# Patient Record
Sex: MALE | URBAN - METROPOLITAN AREA
[De-identification: names, ages, dates, MRNs, and addresses within clinical notes are randomized per-mention and may not be internally consistent; named-entity substitution may affect disease eponyms.]

---

## 2023-03-02 ENCOUNTER — APPOINTMENT (OUTPATIENT)
Dept: URBAN - METROPOLITAN AREA CLINIC 315 | Age: 58
Setting detail: DERMATOLOGY
End: 2023-03-02

## 2023-03-02 DIAGNOSIS — Z85.828 PERSONAL HISTORY OF OTHER MALIGNANT NEOPLASM OF SKIN: ICD-10-CM

## 2023-03-02 DIAGNOSIS — D22 MELANOCYTIC NEVI: ICD-10-CM

## 2023-03-02 DIAGNOSIS — L57.8 OTHER SKIN CHANGES DUE TO CHRONIC EXPOSURE TO NONIONIZING RADIATION: ICD-10-CM

## 2023-03-02 DIAGNOSIS — L81.4 OTHER MELANIN HYPERPIGMENTATION: ICD-10-CM

## 2023-03-02 DIAGNOSIS — Z12.83 ENCOUNTER FOR SCREENING FOR MALIGNANT NEOPLASM OF SKIN: ICD-10-CM

## 2023-03-02 PROBLEM — D23.71 OTHER BENIGN NEOPLASM OF SKIN OF RIGHT LOWER LIMB, INCLUDING HIP: Status: ACTIVE | Noted: 2023-03-02

## 2023-03-02 PROBLEM — D22.5 MELANOCYTIC NEVI OF TRUNK: Status: ACTIVE | Noted: 2023-03-02

## 2023-03-02 PROCEDURE — OTHER SUNSCREEN RECOMMENDATIONS: OTHER

## 2023-03-02 PROCEDURE — OTHER COUNSELING: OTHER

## 2023-03-02 PROCEDURE — 99213 OFFICE O/P EST LOW 20 MIN: CPT

## 2023-03-02 PROCEDURE — OTHER MIPS QUALITY: OTHER

## 2023-03-02 PROCEDURE — OTHER RECORDS REVIEWED: OTHER

## 2023-03-02 PROCEDURE — OTHER REASSURANCE: OTHER

## 2023-03-02 ASSESSMENT — LOCATION SIMPLE DESCRIPTION DERM
LOCATION SIMPLE: RIGHT UPPER BACK
LOCATION SIMPLE: RIGHT ANTERIOR NECK
LOCATION SIMPLE: LEFT UPPER BACK
LOCATION SIMPLE: UPPER BACK
LOCATION SIMPLE: NECK
LOCATION SIMPLE: LEFT ANTERIOR NECK
LOCATION SIMPLE: POSTERIOR NECK

## 2023-03-02 ASSESSMENT — LOCATION DETAILED DESCRIPTION DERM
LOCATION DETAILED: RIGHT SUPERIOR UPPER BACK
LOCATION DETAILED: LEFT CLAVICULAR NECK
LOCATION DETAILED: RIGHT MEDIAL TRAPEZIAL NECK
LOCATION DETAILED: LEFT SUPERIOR LATERAL NECK
LOCATION DETAILED: INFERIOR THORACIC SPINE
LOCATION DETAILED: LEFT MEDIAL TRAPEZIAL NECK
LOCATION DETAILED: LEFT SUPERIOR UPPER BACK
LOCATION DETAILED: RIGHT CLAVICULAR NECK

## 2023-03-02 ASSESSMENT — LOCATION ZONE DERM
LOCATION ZONE: NECK
LOCATION ZONE: TRUNK

## 2023-08-22 ENCOUNTER — TELEPHONE (OUTPATIENT)
Dept: RHEUMATOLOGY | Facility: CLINIC | Age: 58
End: 2023-08-22

## 2023-08-22 ENCOUNTER — HOSPITAL ENCOUNTER (OUTPATIENT)
Dept: GENERAL RADIOLOGY | Age: 58
Discharge: HOME | End: 2023-08-22
Attending: INTERNAL MEDICINE
Payer: COMMERCIAL

## 2023-08-22 ENCOUNTER — OFFICE VISIT (OUTPATIENT)
Dept: RHEUMATOLOGY | Facility: CLINIC | Age: 58
End: 2023-08-22
Payer: COMMERCIAL

## 2023-08-22 VITALS
WEIGHT: 205 LBS | RESPIRATION RATE: 16 BRPM | OXYGEN SATURATION: 97 % | TEMPERATURE: 98 F | HEIGHT: 69 IN | BODY MASS INDEX: 30.36 KG/M2 | SYSTOLIC BLOOD PRESSURE: 130 MMHG | HEART RATE: 81 BPM | DIASTOLIC BLOOD PRESSURE: 80 MMHG

## 2023-08-22 DIAGNOSIS — M25.562 CHRONIC PAIN OF LEFT KNEE: ICD-10-CM

## 2023-08-22 DIAGNOSIS — M19.90 OSTEOARTHRITIS, UNSPECIFIED OSTEOARTHRITIS TYPE, UNSPECIFIED SITE: ICD-10-CM

## 2023-08-22 DIAGNOSIS — M06.09 RHEUMATOID ARTHRITIS OF MULTIPLE SITES WITHOUT RHEUMATOID FACTOR (HCC): ICD-10-CM

## 2023-08-22 DIAGNOSIS — M77.9 TENDONITIS: ICD-10-CM

## 2023-08-22 DIAGNOSIS — M06.09 RHEUMATOID ARTHRITIS OF MULTIPLE SITES WITHOUT RHEUMATOID FACTOR (HCC): Primary | ICD-10-CM

## 2023-08-22 DIAGNOSIS — G89.29 CHRONIC PAIN OF LEFT KNEE: ICD-10-CM

## 2023-08-22 PROCEDURE — 3075F SYST BP GE 130 - 139MM HG: CPT | Performed by: INTERNAL MEDICINE

## 2023-08-22 PROCEDURE — 20610 DRAIN/INJ JOINT/BURSA W/O US: CPT | Performed by: INTERNAL MEDICINE

## 2023-08-22 PROCEDURE — 3079F DIAST BP 80-89 MM HG: CPT | Performed by: INTERNAL MEDICINE

## 2023-08-22 PROCEDURE — 99215 OFFICE O/P EST HI 40 MIN: CPT | Performed by: INTERNAL MEDICINE

## 2023-08-22 PROCEDURE — 73560 X-RAY EXAM OF KNEE 1 OR 2: CPT | Performed by: INTERNAL MEDICINE

## 2023-08-22 PROCEDURE — 3008F BODY MASS INDEX DOCD: CPT | Performed by: INTERNAL MEDICINE

## 2023-08-22 PROCEDURE — 73130 X-RAY EXAM OF HAND: CPT | Performed by: INTERNAL MEDICINE

## 2023-08-22 RX ORDER — CLOBETASOL PROPIONATE 0.5 MG/G
CREAM TOPICAL 2 TIMES DAILY
COMMUNITY

## 2023-08-22 RX ORDER — COLCHICINE 0.6 MG/1
1 TABLET ORAL DAILY
COMMUNITY
Start: 2023-04-19 | End: 2023-08-22

## 2023-08-22 RX ORDER — LEVOTHYROXINE SODIUM 75 MCG
75 TABLET ORAL
COMMUNITY
Start: 2023-07-31

## 2023-08-22 RX ORDER — TERBINAFINE HYDROCHLORIDE 250 MG/1
250 TABLET ORAL DAILY
COMMUNITY
Start: 2023-08-16

## 2023-08-22 RX ORDER — TRIAMCINOLONE ACETONIDE 40 MG/ML
40 INJECTION, SUSPENSION INTRA-ARTICULAR; INTRAMUSCULAR ONCE
Status: COMPLETED | OUTPATIENT
Start: 2023-08-22 | End: 2023-08-22

## 2023-08-22 RX ORDER — METHYLPREDNISOLONE 4 MG/1
TABLET ORAL
Qty: 1 EACH | Refills: 0 | Status: SHIPPED | OUTPATIENT
Start: 2023-08-22

## 2023-08-22 RX ORDER — COLCHICINE 0.6 MG/1
0.6 TABLET ORAL 2 TIMES DAILY PRN
Qty: 180 TABLET | Refills: 0 | Status: SHIPPED | OUTPATIENT
Start: 2023-08-22

## 2023-08-22 RX ORDER — MEDROXYPROGESTERONE ACETATE 150 MG/ML
1 INJECTION, SUSPENSION INTRAMUSCULAR CONTINUOUS PRN
COMMUNITY
Start: 2023-05-30 | End: 2023-08-22

## 2023-08-22 RX ADMIN — TRIAMCINOLONE ACETONIDE 40 MG: 40 INJECTION, SUSPENSION INTRA-ARTICULAR; INTRAMUSCULAR at 13:04:00

## 2023-08-22 NOTE — TELEPHONE ENCOUNTER
Medication sent to [facility]. If PA required, our office will receive notification that one is needed.

## 2023-08-22 NOTE — PATIENT INSTRUCTIONS
OSTEOARTHRITIS    Fast Facts    Though some of the joint changes are irreversible, most patients will not need joint replacement surgery. OA symptoms (what you feel) can vary greatly among patients. A rheumatologist can detect arthritis and prescribe the proper treatment. The goal of treatment in OA is to reduce pain and improve function. Exercise is an important part of OA treatment, because it can decrease joint pain and improve function. At present, there is no treatment that can reverse the damage of OA in the joints. Researchers are trying to find ways to slow or reverse this joint damage. Osteoarthritis (also known as OA) is a common joint disease that most often affects middle-age to elderly people. It is commonly referred to as \"wear and tear\" of the joints, but we now know that OA is a disease of the entire joint, involving the cartilage, joint lining, ligaments, and bone. Although it is more common in older people, it is not really accurate to say that the joints are just \"wearing out. \" It is characterized by breakdown of the cartilage (the tissue that cushions the ends of the bones between joints), bony changes of the joints, deterioration of tendons and ligaments, and various degrees of inflammation of the joint lining (called the synovium). This arthritis tends to occur in the hand joints, spine, hips, knees, and great toes. The lifetime risk of developing OA of the knee is about 46%, and the lifetime risk of developing OA of the hip is 25%, according to the [facility], a long-term study from the [address] [place] [address] and sponsored by [employer] for [employer] and [employer] (often called the [employer]) and the [employer]. OA is a top cause of disability in older people. The goal of osteoarthritis treatment is to reduce pain and improve function.  There is no cure for the disease, but some treatments attempt to slow disease progression. What is osteoarthritis? OA is a frequently slowly progressive joint disease typically seen in middle-aged to elderly people. In osteoarthritis, the cartilage between the bones in the joint breaks down. This causes the affected bones to slowly get bigger. The joint cartilage often breaks down because of mechanical stress or biochemical changes within the body, causing the bone underneath to fail. OA can occur together with other types of arthritis, such as gout or rheumatoid arthritis. OA tends to affect commonly used joints such as the hands and spine, and the weight-bearing joints such as the hips and knees. Symptoms include:    Joint pain and stiffness    Knobby swelling at the joint    Cracking or grinding noise with joint movement    Decreased function of the joint    How do you treat osteoarthritis? There is no proven treatment yet that can reverse joint damage from OA. The goal of osteoarthritis treatment is to reduce pain and improve function of the affected joints. Most often, this is possible with a mixture of physical measures and drug therapy and, sometimes, surgery. Physical measures: Weight loss and exercise are useful in OA. Excess weight puts stress on your knee joints and hips and low back. For every 10 pounds of weight you lose over 10 years, you can reduce the chance of developing knee OA by up to 50 percent. Exercise can improve your muscle strength, decrease joint pain and stiffness, and lower the chance of disability due to OA. Also helpful are support (\"assistive\") devices, such as orthotics or a walking cane, that help you do daily activities. Heat or cold therapy can help relieve OA symptoms for a short time. Certain alternative treatments such as spa (hot tub), massage, and chiropractic manipulation can help relieve pain for a short time. They can be costly, though, and require repeated treatments.  Also, the long-term benefits of these alternative (sometimes called complementary or integrative) medicine treatments are unproven but are under study. Drug therapy: Forms of drug therapy include topical, oral (by mouth) and injections (shots). You apply topical drugs directly on the skin over the affected joints. These medicines include capsaicin cream, lidocaine and diclofenac gel. Oral pain relievers such as acetaminophen are common first treatments. So are nonsteroidal anti-inflammatory drugs (often called NSAIDs), which decrease swelling and pain. In 2010, the government (FDA) approved the use of duloxetine (Cymbalta) for chronic (long-term) musculoskeletal pain including from OA. This oral drug is not new. It also is in use for other health concerns, such as mood disorders, nerve pain and fibromyalgia. Patients with more serious pain may need stronger medications, such as prescription narcotics. Joint injections with corticosteroids (sometimes called cortisone shots) or with a form of lubricant called hyaluronic acid can give months of pain relief from OA. This lubricant is given in the knee, and these shots may help delay the need for a knee replacement by a few years in some patients. Surgery: Surgical treatment becomes an option for severe cases. This includes when the joint has serious damage, or when medical treatment fails to relieve pain and you have major loss of function. Surgery may involve arthroscopy, repair of the joint done through small incisions (cuts). If the joint damage cannot be repaired, you may need a joint replacement. Supplements: Many over-the-counter nutrition supplements have been used for osteoarthritis treatment. Most lack good research data to support their effectiveness and safety. Among the most widely used are calcium, vitamin D and omega-3 fatty acids. To ensure safety and avoid drug interactions, consult your doctor or pharmacist before using any of these supplements.  This is especially true when you are combining these supplements with prescribed

## 2023-08-22 NOTE — TELEPHONE ENCOUNTER
Patient has been stable on Enbrel 50 mg subcu once a week for many years.   I sent it to [address] [place] [address] and I am not sure if you need to do anything more? new preauthorization etc.

## 2023-08-29 ENCOUNTER — TELEPHONE (OUTPATIENT)
Dept: RHEUMATOLOGY | Facility: CLINIC | Age: 58
End: 2023-08-29

## 2023-08-29 NOTE — TELEPHONE ENCOUNTER
NDICATIONS:  G89.29 Chronic pain of left knee M25.562 Chronic pain of left knee M19.90 Osteoarthritis, unspecified osteoarthritis type, unspecified site M06.09 Rheumatoid a*      PATIENT STATED HISTORY: (As transcribed by Technologist)  Left knee pain and stiffness all over joint, off and on, for past 20yrs. FINDINGS:  No evidence of acute displaced fracture or dislocation. Normal mineralization. Mild-to-moderate tricompartmental osteoarthritic changes noted. Small suprapatellar joint effusion. Moderate arthritis of the left knee    Hand x-ray showed mild osteoarthritis    No change from us unless patient worsens clinically.

## 2023-08-29 NOTE — TELEPHONE ENCOUNTER
Pt called back to go over results. I advised him I would route the call and someone would call him back. After hanging up with him I realized I could have given him his results. I called back and left a VM for him to call back.

## 2023-09-05 NOTE — TELEPHONE ENCOUNTER
Called pt, notified of imaging results per Dr. Tiesha Perez. Moderate arthritis of the left knee     Hand x-ray showed mild osteoarthritis     No change from us unless patient worsens clinically.     Pt voices understanding

## 2023-12-06 RX ORDER — MEDROXYPROGESTERONE ACETATE 150 MG/ML
50 INJECTION, SUSPENSION INTRAMUSCULAR
Qty: 12 ML | Refills: 5 | Status: SHIPPED | OUTPATIENT
Start: 2023-12-06

## 2023-12-06 NOTE — TELEPHONE ENCOUNTER
LOV:   08/22/2023       Future Appointments   Date Time Provider Layo Starks   2/19/2024 10:40 AM Gianluca Ruiz MD [facility] [address]       LF:  08/22/2023    QTY:   4.2 mL      Refills:    5    LABS:      Lab results scanned under the lab tab  as lab result scan on 07/20/2023

## 2023-12-18 ENCOUNTER — TELEPHONE (OUTPATIENT)
Dept: RHEUMATOLOGY | Facility: CLINIC | Age: 58
End: 2023-12-18

## 2023-12-18 NOTE — TELEPHONE ENCOUNTER
PA for Enbrel [employer] started on [employer].  Key: [id number]    Not submitted -- outcome from health plan- Clinical Override is not needed

## 2024-01-15 RX ORDER — COLCHICINE 0.6 MG/1
0.6 TABLET ORAL 2 TIMES DAILY PRN
Qty: 180 TABLET | Refills: 3 | Status: SHIPPED | OUTPATIENT
Start: 2024-01-15

## 2024-01-15 NOTE — TELEPHONE ENCOUNTER
LOV:   08/22/2023      Future Appointments   Date Time Provider Department Center   2/19/2024 10:40 AM Donna Ruiz MD [facility] [address]       LF:  08/22/2023   QTY:  180   Refills:  0

## 2024-02-19 ENCOUNTER — LAB ENCOUNTER (OUTPATIENT)
Dept: LAB | Age: 59
End: 2024-02-19
Attending: INTERNAL MEDICINE
Payer: COMMERCIAL

## 2024-02-19 ENCOUNTER — OFFICE VISIT (OUTPATIENT)
Dept: RHEUMATOLOGY | Facility: CLINIC | Age: 59
End: 2024-02-19
Payer: COMMERCIAL

## 2024-02-19 VITALS
TEMPERATURE: 98 F | BODY MASS INDEX: 30.21 KG/M2 | HEIGHT: 69 IN | DIASTOLIC BLOOD PRESSURE: 80 MMHG | RESPIRATION RATE: 16 BRPM | OXYGEN SATURATION: 96 % | WEIGHT: 204 LBS | HEART RATE: 77 BPM | SYSTOLIC BLOOD PRESSURE: 120 MMHG

## 2024-02-19 DIAGNOSIS — M19.90 OSTEOARTHRITIS, UNSPECIFIED OSTEOARTHRITIS TYPE, UNSPECIFIED SITE: Primary | ICD-10-CM

## 2024-02-19 DIAGNOSIS — M06.09 RHEUMATOID ARTHRITIS OF MULTIPLE SITES WITHOUT RHEUMATOID FACTOR (HCC): ICD-10-CM

## 2024-02-19 DIAGNOSIS — G89.29 CHRONIC PAIN OF LEFT KNEE: ICD-10-CM

## 2024-02-19 DIAGNOSIS — M19.90 OSTEOARTHRITIS, UNSPECIFIED OSTEOARTHRITIS TYPE, UNSPECIFIED SITE: ICD-10-CM

## 2024-02-19 DIAGNOSIS — M25.562 CHRONIC PAIN OF LEFT KNEE: ICD-10-CM

## 2024-02-19 PROBLEM — G56.92 NEUROPATHY OF FINGER OF LEFT HAND: Status: RESOLVED | Noted: 2024-02-19 | Resolved: 2024-02-19

## 2024-02-19 PROBLEM — G56.92 NEUROPATHY OF FINGER OF LEFT HAND: Status: ACTIVE | Noted: 2024-02-19

## 2024-02-19 LAB
ALBUMIN SERPL-MCNC: 4.1 G/DL (ref 3.4–5)
ALBUMIN/GLOB SERPL: 1 {RATIO} (ref 1–2)
ALP LIVER SERPL-CCNC: 85 U/L
ALT SERPL-CCNC: 71 U/L
ANION GAP SERPL CALC-SCNC: 3 MMOL/L (ref 0–18)
AST SERPL-CCNC: 32 U/L (ref 15–37)
BASOPHILS # BLD AUTO: 0.06 X10(3) UL (ref 0–0.2)
BASOPHILS NFR BLD AUTO: 0.7 %
BILIRUB SERPL-MCNC: 0.4 MG/DL (ref 0.1–2)
BUN BLD-MCNC: 15 MG/DL (ref 9–23)
CALCIUM BLD-MCNC: 9.6 MG/DL (ref 8.5–10.1)
CHLORIDE SERPL-SCNC: 106 MMOL/L (ref 98–112)
CO2 SERPL-SCNC: 29 MMOL/L (ref 21–32)
CREAT BLD-MCNC: 1.21 MG/DL
EGFRCR SERPLBLD CKD-EPI 2021: 69 ML/MIN/1.73M2 (ref 60–?)
EOSINOPHIL # BLD AUTO: 0.23 X10(3) UL (ref 0–0.7)
EOSINOPHIL NFR BLD AUTO: 2.8 %
ERYTHROCYTE [DISTWIDTH] IN BLOOD BY AUTOMATED COUNT: 13.5 %
ERYTHROCYTE [SEDIMENTATION RATE] IN BLOOD: 53 MM/HR
FASTING STATUS PATIENT QL REPORTED: NO
GLOBULIN PLAS-MCNC: 4.3 G/DL (ref 2.8–4.4)
GLUCOSE BLD-MCNC: 95 MG/DL (ref 70–99)
HCT VFR BLD AUTO: 46.8 %
HGB BLD-MCNC: 15.6 G/DL
IMM GRANULOCYTES # BLD AUTO: 0.04 X10(3) UL (ref 0–1)
IMM GRANULOCYTES NFR BLD: 0.5 %
LYMPHOCYTES # BLD AUTO: 2.56 X10(3) UL (ref 1–4)
LYMPHOCYTES NFR BLD AUTO: 31.4 %
MCH RBC QN AUTO: 28.8 PG (ref 26–34)
MCHC RBC AUTO-ENTMCNC: 33.3 G/DL (ref 31–37)
MCV RBC AUTO: 86.3 FL
MONOCYTES # BLD AUTO: 0.82 X10(3) UL (ref 0.1–1)
MONOCYTES NFR BLD AUTO: 10 %
NEUTROPHILS # BLD AUTO: 4.45 X10 (3) UL (ref 1.5–7.7)
NEUTROPHILS # BLD AUTO: 4.45 X10(3) UL (ref 1.5–7.7)
NEUTROPHILS NFR BLD AUTO: 54.6 %
OSMOLALITY SERPL CALC.SUM OF ELEC: 287 MOSM/KG (ref 275–295)
PLATELET # BLD AUTO: 367 10(3)UL (ref 150–450)
POTASSIUM SERPL-SCNC: 4.2 MMOL/L (ref 3.5–5.1)
PROT SERPL-MCNC: 8.4 G/DL (ref 6.4–8.2)
RBC # BLD AUTO: 5.42 X10(6)UL
SODIUM SERPL-SCNC: 138 MMOL/L (ref 136–145)
VIT D+METAB SERPL-MCNC: 33.4 NG/ML (ref 30–100)
WBC # BLD AUTO: 8.2 X10(3) UL (ref 4–11)

## 2024-02-19 PROCEDURE — 36415 COLL VENOUS BLD VENIPUNCTURE: CPT

## 2024-02-19 PROCEDURE — 86480 TB TEST CELL IMMUN MEASURE: CPT

## 2024-02-19 PROCEDURE — 82306 VITAMIN D 25 HYDROXY: CPT

## 2024-02-19 PROCEDURE — 80053 COMPREHEN METABOLIC PANEL: CPT

## 2024-02-19 PROCEDURE — 85652 RBC SED RATE AUTOMATED: CPT

## 2024-02-19 PROCEDURE — 85025 COMPLETE CBC W/AUTO DIFF WBC: CPT

## 2024-02-19 RX ORDER — TRIAMCINOLONE ACETONIDE 40 MG/ML
40 INJECTION, SUSPENSION INTRA-ARTICULAR; INTRAMUSCULAR ONCE
Status: COMPLETED | OUTPATIENT
Start: 2024-02-19 | End: 2024-02-19

## 2024-02-19 RX ORDER — MEDROXYPROGESTERONE ACETATE 150 MG/ML
50 INJECTION, SUSPENSION INTRAMUSCULAR
Qty: 12 ML | Refills: 5 | Status: SHIPPED | OUTPATIENT
Start: 2024-02-19

## 2024-02-19 RX ADMIN — TRIAMCINOLONE ACETONIDE 40 MG: 40 INJECTION, SUSPENSION INTRA-ARTICULAR; INTRAMUSCULAR at 11:39:00

## 2024-02-19 NOTE — PROGRESS NOTES
[facility], [address] [place] [address]      Consult     Omar Roe Patient Status:  No patient class for patient encounter    1965 MRN [id number]   Location [address] Attending No att. providers found   Hosp Day # 0 PCP YOVANI LIANG     Referring Provider:     Reason for Consultation:     Subjective:    Omar Roe is a 58 year old male for follow-up for seropositive rheumatoid arthritis with incidental chondrocalcinosis of the knees and pseudogout arthritis. Also generalized osteoarthritis.    Patient was last seen in clinic 2023    Labs stable 2023    He has been doing well subsequently.    He had a trigger finger injection 2020 with continued improvement.    He also had a left knee injection 2023 and states he had lasted in 3 months previously failed Synvisc injections    He states they are not lasting as long as it used to and he is considering seeing another orthopedic surgeon consider left knee replacement surgery in the next year.  He is hoping to get through the summer to play golf    Overall hand pain and stiffness as well    X-rays updated of the hands were unrevealing other than mild arthritis in 2023 left knee x-rays showed moderate to severe arthritis of the left knee    He has backed down on his colchicine to once a day.  He is open to updating labs at this time    He remains active and exercises regularly.     But recently noticed increased stiffness in his left knee and is looking for another knee injection which she had done in May 2022    Previously he has had chronic lesion removed prior to starting Enbrel and was diagnosed with basal cell carcinoma with resection    He is now followed by dermatologist every 6 months    He is not interested in switching Enbrel to another agent that is not anti-TNF    I have discussed if he has recurrence of any skin cancer we need to stop and switch  therapy    He understands the risks associated with anti-TNF therapy and increased risk of skin cancers.    He admits to not exercising regularly otherwise.    His son has moved back to [address] [place] [address] from [place] so he is not traveling as much anymore. He is also busy with his first grandchild    States no overt swelling of the joint itself    He has continued his Enbrel 50 mg subcutaneous every week    And colchicine 0.6 mg once day for CPPD arthritis    He had stopped methotrexate without any worsening symptoms    About 4 years ago.    He continued low-dose colchicine because of chondrocalcinosis and CPPD arthritis.    Previously Cervical neck x-rays did show mild degenerative changes. He was not interested in further intervention or physical therapy.    Previously X-rays were obtained which showed moderate to significant osteoarthritis of the left knee. X-rays of the hands and feet showed mild degenerative osteo-arthritis with no erosive disease in April 2018.    He has seen orthopedic surgery in the past who had done steroid injections and Synvisc injections with minimal to no relief years ago. He did receive a left knee injection and May 2018 with significant improvement in his pain he has no further swelling in that knee.    He'll consider a knee replacement once he retires in another year hopefully.    He was doing well otherwise continued on Enbrel 50 mg subcutaneous every week.    Denies any swelling of the joints otherwise his last vectra with low at 17. Denies any night sweats or fevers or chills or infections. No shortness of breath or chest pain. In general is doing quite well    Previous showed normal CBC CMP TSH and PSA levels July 2023      History/Other:        Past Medical History:History reviewed. No pertinent past medical history.     Past Surgical History: History reviewed. No pertinent surgical history.    Social History:  reports that he has never smoked. He has never used smokeless tobacco. He  reports current alcohol use. He reports that he does not use drugs.    Family History: History reviewed. No pertinent family history.    Allergies:   Allergies   Allergen Reactions    Penicillins RASH     As a child.     As a child.       Current Medications:  Current Outpatient Medications   Medication Sig Dispense Refill    colchicine 0.6 MG Oral Tab Take 1 tablet (0.6 mg total) by mouth 2 (two) times daily as needed. 180 tablet 3    Etanercept (ENBREL SURECLICK) 50 MG/ML Subcutaneous Solution Auto-injector Inject 50 mg into the skin every 7 days. 12 mL 5    SYNTHROID 75 MCG Oral Tab Take 1 tablet (75 mcg total) by mouth before breakfast.      clobetasol 0.05 % External Cream Apply topically 2 (two) times daily. As needed            (Not in a hospital admission)      Review of Systems:     Constitutional: Negative for chills, , fatigue, fever and unexpected weight change.    HENT: Negative for congestion, and mouth sores.    Eyes: Negative for photophobia, pain, redness and visual disturbance.    Respiratory: Negative for apnea, cough, chest tightness, shortness of breath, wheezing and stridor.    Cardiovascular: Negative for chest pain, palpitations and leg swelling.    Gastrointestinal: Negative for abdominal distention, abdominal pain, blood in stool, constipation, diarrhea and nausea.    Endocrine: Negative.     Genitourinary: Negative for decreased urine volume, difficulty urinating, dyspareunia, dysuria, flank pain, and frequency.    Musculoskeletal: + arthralgias, no gait problem and joint swelling.    Skin: Negative for color change, pallor and rash. No raynauds or digital ulcerations no sclerodactly.    Allergic/Immunologic: Negative.    Neurological: Negative for dizziness, tremors, seizures, syncope, speech difficulty, weakness, light-headedness, numbness and headaches.    Hematological: Does not bruise/bleed easily.    Psychiatric/Behavioral: Negative for confusion, decreased concentration,  hallucinations, self-injury, sleep disturbance and suicidal ideas or depression.    Objective:   [unfilled]  Vitals:    02/19/24 1052   BP: 120/80   Pulse: 77   Resp: 16   Temp: 98 °F (36.7 °C)          Constitutional: is oriented to person, place, and time. Appears well-developed and well-nourished. No distress.    HEENT: Normocephalic; EOMI; no jvd; no LAD; no oral or nasal ulcers.     Eyes: Conjunctivae and EOM are normal. Pupils are equal, round, and reactive to light.     Neck: Normal range of motion. No thyromegaly present.    Cardiovascular: RRR, no murmurs.    Lungs: Clear, Bilateral air entry, no wheezes.    Abdominal: Soft.    Musculoskeletal:    There is currently no information documented on the homunculus. Go to the Rheumatology activity and complete the homunculus joint exam.     Joint Exam 02/19/2024     No joint exam has been documented for this visit        Swollen: --     Tender: --         Right shoulder: Exhibits normal range of motion on abduction and internal rotation, no tenderness, no bony tenderness, no deformity, no laceration, no pain and no spasm.        Left shoulder: Exhibits normal range of motion on abduction and internal and external rotation.  no tenderness, no bony tenderness, no swelling, no effusion, no deformity, no pain, no spasm and normal strength.        Right elbow:  Exhibits normal range of motion, no swelling, no effusion and no deformity. No tenderness found. No medial epicondyle, no lateral epicondyle and no olecranon process tenderness noted. There are no contractures or tophi or nodules.        Left elbow:  Normal range of motion, no swelling, no effusion and no deformity. No medial epicondyle, no lateral epicondyle and no olecranon process tenderness noted. There are no contractures or tophi or nodules.        Right wrist:  Exhibits normal range of motion, no tenderness, no bony tenderness, no swelling, no effusion and no crepitus. Flexion and extension intact  w/o limitation.        Left wrist: Exhibits normal range of motion, no tenderness, no bony tenderness, no swelling, no effusion, no crepitus and no deformity. Flexion and extension intact without limitation.        Right hip: Exhibits normal range of motion, normal strength, no tenderness, no bony tenderness, no swelling and no crepitus.        Right hand: No synovitis of MCP,PIP or DIP joints; there are scattered Bouchards and Heberden nodules noted;  strength: 100%.  Moderate squaring first CMC joint  Finkelstein  test positive de Quervain's tenosynovitis        Left hand: No synovitis of MCP,PIP or DIP joints; there are scattered Bouchards and Heberden nodules noted;  strength: 100%.  Moderate squaring first CMC joint        Left hip: Exhibits normal range of motion, normal strength, no tenderness, no bony tenderness, No swelling and no crepitus.        Right knee: Exhibits normal range of motion, no swelling, no effusion, no ecchymosis, no deformity and no erythema. No tenderness found. No medial joint line, no lateral joint line, no MCL and no LCL tenderness noted. mild crepitation on flexion of knee and extension normal.        Left knee:  Exhibits normal range of motion, no swelling, small effusion, no ecchymosis and no erythema. No tenderness found. No medial joint line, no lateral joint line and no patellar tendon tenderness noted. mod crepitation on flexion of the knee. Extension intact and normal.        Right ankle: No swelling, no deformity. No tenderness. Dorsiflexion and plantar flexion intact without limitation in range of motion.        Left ankle: Exhibits no swelling. No tenderness. No lateral malleolus and no medial malleolus tenderness found. Achilles tendon normal. Achilles tendon exhibits no pain, no defect and normal Catalan's test results.  Dorsiflexion and plantar flexion intact without limitation in range of motion.        Cervical back: Exhibits normal range of motion, no  tenderness, no bony tenderness, no swelling, no pain and no spasm.        Thoracic back: Exhibits normal range of motion, no tenderness, no bony tenderness and no spasm.        Lumbar back:  Exhibits normal range of motion, no tenderness, no bony tenderness, no pain and no spasm.        Right foot: normal. There is normal range of motion, no tenderness, no bony tenderness, no crepitus and no laceration. There is no synovitis or tenderness of the MTP joints to palpation.  Bony enlargement of the first MTP joint        Left foot: normal. There is normal range of motion, no tenderness, no bony tenderness and no crepitus. There is no synovitis or tenderness of the MTP joints to palpation.  Bony enlargement of the first MTP joint    Lymphadenopathy: No submental, no submandibular, and no occipital adenopathy present, has no cervical adenopathy or axillary lympadenopathy.    Neurological: Alert and oriented. No focal motor or sensory abnormalities. Strength is 5/5 Upper Extremities/Lower Extremities proximally and distally.    Skin: Skin is warm, dry and intact.    Psychiatric: Normal behavior.    Results:    Labs:    CBC CMP normal TSH normal PSA normal July 2023    TB testing normal November 2022    @LABRCNTIP(RF,B12)@      [unfilled]    Imaging:    Narrative   PROCEDURE:  XR KNEE (1 OR 2 VIEWS), LEFT (CPT=73560)     COMPARISON:  None.     INDICATIONS:  G89.29 Chronic pain of left knee M25.562 Chronic pain of left knee M19.90 Osteoarthritis, unspecified osteoarthritis type, unspecified site M06.09 Rheumatoid a*     PATIENT STATED HISTORY: (As transcribed by Technologist)  Left knee pain and stiffness all over joint, off and on, for past 20yrs.      FINDINGS:  No evidence of acute displaced fracture or dislocation.  Normal mineralization.  Mild-to-moderate tricompartmental osteoarthritic changes noted.  Small suprapatellar joint effusion.                   Impression   CONCLUSION:  No evidence of acute displaced  fracture or dislocation in the left knee.  Degenerative changes as above.  Small joint effusion.     LOCATION:  [id number]           INDICATIONS:  M19.90 Osteoarthritis, unspecified osteoarthritis type, unspecified site M06.09 Rheumatoid arthritis of multiple sites without rheumatoid factor (HCC)     PATIENT STATED HISTORY: (As transcribed by Technologist)  Bilateral hands pain/stiffness all over, off and on, for past 10yrs. Metallic foreign body in right 4th finger from working with metal about 10yrs ago per patient.      FINDINGS:    RIGHT HAND: No evidence of acute displaced fracture or dislocation.  Osteopenia.  Unremarkable soft tissues.  Mild osteoarthritic changes in the interphalangeal joints, 1st CMC joint, and the radiocarpal joint.  Degenerative cystic formation in lunate  noted.  Accessory ossicle or old avulsion injury along the 1st interphalangeal joint.  Chronic posttraumatic deformity of the mid shaft of the right 5th metacarpal.     LEFT HAND: No evidence of acute displaced fracture or dislocation.  Osteopenia.  Unremarkable soft tissues mild scattered osteoarthritic changes in the interphalangeal joints, 1st CMC joint, and 1st MCP joint.                     Impression  CONCLUSION:  No evidence of acute displaced fracture or dislocation in the hands.  Degenerative changes as above.  Please see above for further details.     LOCATION:  [facility]        Dictated by (CST): Nir Zuleta MD on 8/22/2023 at 2:08 PM      Finalized by (CST): Nir Zuleta MD on 8/22/2023 at 2:10 PM    Reviewed    Imaging:x-rays of the right foot smile small plantar calcaneus spur left foot small calcaneal spur left and right knee x-rays mild degenerative arthritis left and right hand x-rays mild degenerative arthritis left hand x-ray is normal right and x-rays no acute abnormalities    X-ray of the left knee moderate osteoarthritis April 2018  X-rays of the hands and feet mild generalized osteoarthritis no erosive disease in  April 2018     Assessment & Plan:      58-year-old gentleman comes in for reevaluation for:    Seropositive rheumatoid arthritis  Generalized osteoarthritis  Pseudogout arthritis multiple joints  Mild left knee pain likely arthritic in nature  History of basal cell carcinoma status post resection    Patient has no obvious joint swelling and exam .    X-rays of the knee did confirm pseudogout and moderate to severe osteoarthritis of the left knee. Responded to steroid injection May 2018.  Hand x-rays show stable arthritic changes August 2023  Continue colchicine 0.6 mg daily. Risks side effects discussed.    Left knee steroid injection done August 2023.  Patient opted for another left knee injection today  He understands x-rays show moderate to severe arthritis he will need to see orthopedic surgeon at some point.  He is open to getting more information from local orthopedic care    He would like to proceed with another injection today written and verbal consent obtained    Avoid NSAIDS bc of hx of abn LFT    Avoid alcohol intake    Also suggest quadriceps strengthening exercising.    Continue Enbrel 50 mg subcutaneous every week    Recent diagnosis of basal cell carcinoma status post resection. Patient states this has been there prior to Enbrel and he is not interested in switching therapy despite risk of increase skin cancers with anti-TNF treatment. He is getting skin screening every 6 months now. He understands potential risks of treatment. He understands if he gets another skin cancer we need to switch treatment    We'll update x-rays to monitor for radiographic progression    TB testing normal November 2022    Status post trigger finger injection third middle finger right hand (resolution of symptoms 11/20)    After consent was obtained, using sterile technique the left knee was prepped and landmarks obtained and sites was marked using lateral technique.The left lateral suprapatellar bursa was palpated as well  as patella and quadricepts tendon. Ethyl chloride spray was used after betadine was used to clean area. plain Lidocaine 1% plain was used as local anesthetic. The joint was entered and 25 gauge needle with 25 inch 1 1/2 needle 1 cc's of *llidocaine 2% was used to anesthesize area and then needle was switched and 40mg kenalog 1 cc mg and 1 ml plain Lidocaine was then injected and the needle withdrawn. The procedure was well tolerated. The patient is asked to continue to rest the joint for a few more days before resuming regular activities. It may be more painful for the first 1-2 days. Watch for fever, or increased swelling or persistent pain in the joint. Call or return to clinic prn if such symptoms occur or there is failure to improve as anticipated.     Education and counseling provided to patient.  Instructed patient to call my office or seek medical attention immediately if symptoms worsen. Risks and side effects of medications and diagnosis discussed in detail and patient was given written information on new prescribed medications.    Return to clinic:  Return in about 6 months (around 8/19/2024).    Donna Ruiz MD  8/22/2023      Routine Assessment of Patient Index Data       1. Please check the one that best answers the patients abilities at this time:  A. Dress yourself, including tying shoelaces and doing buttons?: Without any difficulty Dress Raw Score: 0     B. Get in and out of bed?: Without any difficulty Bed Raw Score: 0     C. Lift a full cup or glass to your mouth?: Without any difficulty Cup Raw Score: 0     D. Walk outdoors on flat ground?: Without any difficulty Walk Raw Score: 0     E. Wash and dry your entire body?: Without any difficulty Bathe Raw Score: 0     F. Bend down to  clothing from the floor?: Without any difficulty Bend Raw Score: 0     G. Turn regular faucets on and off?: With some difficulty Faucet Raw Score: 1     H. Get in and out of a car, bus, train, or airplane?:  With some difficulty Vehicle Raw Score: 1     I. Walk two miles or three kilometers, if you wish?: With some difficulty Miles Raw Score: 1     J. Participate in recreational activities and sports as you would like, if you wish?: With much difficulty Participate Raw Score: 2     Functional Status (FN) Pre-Score - Rows A-J: 5 A-J FN Converted Score: 1.7       K. Get a good night's sleep?: With some difficulty     L. Deal with feelings of anxiety or being nervous?: Without any difficulty     M. Deal with feelings of depression or feeling blue?: Without any difficulty         2. How much pain have you had because of your condition over the past week?:  Patient's Pain Tolerance (PN): 3.0         3. Considering all the ways in which illness and health conditions may affect you at this time, how are you doing?:  Patient's Global Estimate (PTGE): 2.5         RAPID 3 Score and Severity:  RAPID 3 Cumulative Score: 7.2 Severity: Moderate Severity

## 2024-02-19 NOTE — PATIENT INSTRUCTIONS
OSTEOARTHRITIS    Fast Facts    Though some of the joint changes are irreversible, most patients will not need joint replacement surgery.    OA symptoms (what you feel) can vary greatly among patients.    A rheumatologist can detect arthritis and prescribe the proper treatment. The goal of treatment in OA is to reduce pain and improve function.    Exercise is an important part of OA treatment, because it can decrease joint pain and improve function.    At present, there is no treatment that can reverse the damage of OA in the joints. Researchers are trying to find ways to slow or reverse this joint damage.    Osteoarthritis (also known as OA) is a common joint disease that most often affects middle-age to elderly people. It is commonly referred to as \"wear and tear\" of the joints, but we now know that OA is a disease of the entire joint, involving the cartilage, joint lining, ligaments, and bone. Although it is more common in older people, it is not really accurate to say that the joints are just \"wearing out.\" It is characterized by breakdown of the cartilage (the tissue that cushions the ends of the bones between joints), bony changes of the joints, deterioration of tendons and ligaments, and various degrees of inflammation of the joint lining (called the synovium).    This arthritis tends to occur in the hand joints, spine, hips, knees, and great toes. The lifetime risk of developing OA of the knee is about 46%, and the lifetime risk of developing OA of the hip is 25%, according to the [facility] Osteoarthritis Project, a long-term study from the [facility] and sponsored by the Centers for Disease Control and Prevention (often called the CDC) and the National Institutes of Health.    OA is a top cause of disability in older people. The goal of osteoarthritis treatment is to reduce pain and improve function. There is no cure for the disease, but some treatments attempt to slow disease  progression.         What is osteoarthritis?    OA is a frequently slowly progressive joint disease typically seen in middle-aged to elderly people. In osteoarthritis, the cartilage between the bones in the joint breaks down. This causes the affected bones to slowly get bigger. The joint cartilage often breaks down because of mechanical stress or biochemical changes within the body, causing the bone underneath to fail. OA can occur together with other types of arthritis, such as gout or rheumatoid arthritis.    OA tends to affect commonly used joints such as the hands and spine, and the weight-bearing joints such as the hips and knees. Symptoms include:    Joint pain and stiffness    Knobby swelling at the joint    Cracking or grinding noise with joint movement    Decreased function of the joint    How do you treat osteoarthritis?    There is no proven treatment yet that can reverse joint damage from OA. The goal of osteoarthritis treatment is to reduce pain and improve function of the affected joints. Most often, this is possible with a mixture of physical measures and drug therapy and, sometimes, surgery.    Physical measures: Weight loss and exercise are useful in OA. Excess weight puts stress on your knee joints and hips and low back. For every 10 pounds of weight you lose over 10 years, you can reduce the chance of developing knee OA by up to 50 percent. Exercise can improve your muscle strength, decrease joint pain and stiffness, and lower the chance of disability due to OA. Also helpful are support (\"assistive\") devices, such as orthotics or a walking cane, that help you do daily activities. Heat or cold therapy can help relieve OA symptoms for a short time.    Certain alternative treatments such as spa (hot tub), massage, and chiropractic manipulation can help relieve pain for a short time. They can be costly, though, and require repeated treatments. Also, the long-term benefits of these alternative  (sometimes called complementary or integrative) medicine treatments are unproven but are under study.    Drug therapy: Forms of drug therapy include topical, oral (by mouth) and injections (shots). You apply topical drugs directly on the skin over the affected joints. These medicines include capsaicin cream, lidocaine and diclofenac gel. Oral pain relievers such as acetaminophen are common first treatments. So are nonsteroidal anti-inflammatory drugs (often called NSAIDs), which decrease swelling and pain.    In 2010, the government (FDA) approved the use of duloxetine (Cymbalta) for chronic (long-term) musculoskeletal pain including from OA. This oral drug is not new. It also is in use for other health concerns, such as mood disorders, nerve pain and fibromyalgia.    Patients with more serious pain may need stronger medications, such as prescription narcotics.    Joint injections with corticosteroids (sometimes called cortisone shots) or with a form of lubricant called hyaluronic acid can give months of pain relief from OA. This lubricant is given in the knee, and these shots may help delay the need for a knee replacement by a few years in some patients.    Surgery: Surgical treatment becomes an option for severe cases. This includes when the joint has serious damage, or when medical treatment fails to relieve pain and you have major loss of function. Surgery may involve arthroscopy, repair of the joint done through small incisions (cuts). If the joint damage cannot be repaired, you may need a joint replacement.    Supplements: Many over-the-counter nutrition supplements have been used for osteoarthritis treatment. Most lack good research data to support their effectiveness and safety. Among the most widely used are calcium, vitamin D and omega-3 fatty acids. To ensure safety and avoid drug interactions, consult your doctor or pharmacist before using any of these supplements. This is especially true when you are  combining these supplements with prescribed

## 2024-02-21 LAB
M TB IFN-G CD4+ T-CELLS BLD-ACNC: 0.03 IU/ML
M TB TUBERC IFN-G BLD QL: NEGATIVE
M TB TUBERC IGNF/MITOGEN IGNF CONTROL: >10 IU/ML
QFT TB1 AG MINUS NIL: 0 IU/ML
QFT TB2 AG MINUS NIL: 0.01 IU/ML

## 2024-03-04 ENCOUNTER — APPOINTMENT (OUTPATIENT)
Dept: URBAN - METROPOLITAN AREA CLINIC 315 | Age: 59
Setting detail: DERMATOLOGY
End: 2024-03-04

## 2024-03-04 DIAGNOSIS — D22 MELANOCYTIC NEVI: ICD-10-CM

## 2024-03-04 DIAGNOSIS — L81.4 OTHER MELANIN HYPERPIGMENTATION: ICD-10-CM

## 2024-03-04 DIAGNOSIS — Z85.828 PERSONAL HISTORY OF OTHER MALIGNANT NEOPLASM OF SKIN: ICD-10-CM

## 2024-03-04 DIAGNOSIS — Z12.83 ENCOUNTER FOR SCREENING FOR MALIGNANT NEOPLASM OF SKIN: ICD-10-CM

## 2024-03-04 PROBLEM — D22.5 MELANOCYTIC NEVI OF TRUNK: Status: ACTIVE | Noted: 2024-03-04

## 2024-03-04 PROCEDURE — OTHER COUNSELING: OTHER

## 2024-03-04 PROCEDURE — 99213 OFFICE O/P EST LOW 20 MIN: CPT

## 2024-03-04 PROCEDURE — OTHER MIPS QUALITY: OTHER

## 2024-03-04 PROCEDURE — OTHER RECORDS REVIEWED: OTHER

## 2024-03-04 ASSESSMENT — LOCATION ZONE DERM
LOCATION ZONE: TRUNK
LOCATION ZONE: NECK

## 2024-03-04 ASSESSMENT — LOCATION DETAILED DESCRIPTION DERM
LOCATION DETAILED: LEFT LATERAL TRAPEZIAL NECK
LOCATION DETAILED: SUPERIOR THORACIC SPINE
LOCATION DETAILED: LEFT SUPERIOR LATERAL NECK
LOCATION DETAILED: RIGHT LATERAL TRAPEZIAL NECK

## 2024-03-04 ASSESSMENT — LOCATION SIMPLE DESCRIPTION DERM
LOCATION SIMPLE: POSTERIOR NECK
LOCATION SIMPLE: NECK
LOCATION SIMPLE: UPPER BACK

## 2024-03-04 NOTE — PROCEDURE: COUNSELING
Abnormal ECG
Sunscreen Recommendations: I recommended a broad spectrum sunscreen with a SPF of 30 or higher. I explained that SPF 30 sunscreens block approximately 97 percent of the sun's harmful rays. Sunscreens should be applied at least 15 minutes prior to expected sun exposure and then every 2 hours after that as long as sun exposure continues. If swimming or exercising sunscreen should be reapplied every 45 minutes to an hour after getting wet or sweating. One ounce, or the equivalent of a shot glass full of sunscreen, is adequate to protect the skin not covered by a bathing suit. I also recommended a lip balm with a sunscreen as well. Sun protective clothing can be used in lieu of sunscreen but must be worn the entire time you are exposed to the sun's rays.
Detail Level: Detailed

## 2024-03-04 NOTE — PROCEDURE: MIPS QUALITY
Quality 130: Documentation Of Current Medications In The Medical Record: Current Medications Documented
Quality 431: Preventive Care And Screening: Unhealthy Alcohol Use - Screening: Patient not identified as an unhealthy alcohol user when screened for unhealthy alcohol use using a systematic screening method
Quality 137: Melanoma: Continuity Of Care - Recall System: Recall system not utilized, reason not otherwise specified
Quality 226: Preventive Care And Screening: Tobacco Use: Screening And Cessation Intervention: Patient screened for tobacco use and is an ex/non-smoker
Quality 47: Advance Care Plan: Advance care planning not documented, reason not otherwise specified.
Detail Level: Detailed

## 2024-03-21 ENCOUNTER — TELEPHONE (OUTPATIENT)
Dept: RHEUMATOLOGY | Facility: CLINIC | Age: 59
End: 2024-03-21

## 2024-03-21 NOTE — TELEPHONE ENCOUNTER
PA for Enbrel started on Cover My Meds Key: [id number])      Your request has been approved  CaseId:[id number];Status:Approved;Review Type:Prior Auth;Coverage Start Date:02/20/2024;Coverage End Date:03/21/2025;

## 2024-04-01 NOTE — PROGRESS NOTES
Faxed last office note to pcp on 2/19/2024   Patient presented to the ED from home following a fall; admitted for syncope and collapse. Met with patient at bedside for transition of care planning. Patient reports she lives in a 1-story home with her son, Leonardo, she is independent and ambulates with a FWW and independent with ADL's, on 4L NC at home (through [employer]); reports her son cooks and cleans. Uses [facility] ([place]), PCP is Dr. Murrell and currently active with [facility] care. Patient reports she has not gotten out of bed yet and is unsure if the plan is home or not. Patient states if [facility] is needed, she wants to go to [facility]. Plan is home versus [facility] pending PT/OT evals; family to transport home if the plan is home. Verified with [facility] care that patient is currently active; will need resumption orders.    Case Management Assessment  Initial Evaluation    Date/Time of Evaluation: 4/1/2024 4:08 PM  Assessment Completed by: NADEGE Jackman    If patient is discharged prior to next notation, then this note serves as note for discharge by case management.    Patient Name: Elisabeth Norris                   YOB: 1935  Diagnosis: Syncope and collapse [R55]  Lumbar back pain [M54.50]  Contusion of right knee, initial encounter [S80.01XA]                   Date / Time: 4/1/2024  2:18 AM    Patient Admission Status: Inpatient   Readmission Risk (Low < 19, Mod (19-27), High > 27): Readmission Risk Score: 14.2    Current PCP: Costa Lara, DO  PCP verified by CM? Yes    Chart Reviewed: Yes      History Provided by: Patient  Patient Orientation: Alert and Oriented    Patient Cognition: Alert    Hospitalization in the last 30 days (Readmission):  No    If yes, Readmission Assessment in CM Navigator will be completed.    Advance Directives:      Code Status: Full Code   Patient's Primary Decision Maker is: Legal Next of Kin    Primary Decision Maker: Noam Norris - Child - [phone]    Secondary

## 2024-08-19 ENCOUNTER — OFFICE VISIT (OUTPATIENT)
Dept: RHEUMATOLOGY | Facility: CLINIC | Age: 59
End: 2024-08-19
Payer: COMMERCIAL

## 2024-08-19 ENCOUNTER — LAB ENCOUNTER (OUTPATIENT)
Dept: LAB | Age: 59
End: 2024-08-19
Attending: INTERNAL MEDICINE
Payer: COMMERCIAL

## 2024-08-19 VITALS
DIASTOLIC BLOOD PRESSURE: 64 MMHG | HEART RATE: 91 BPM | TEMPERATURE: 98 F | BODY MASS INDEX: 30.07 KG/M2 | WEIGHT: 203 LBS | HEIGHT: 69 IN | SYSTOLIC BLOOD PRESSURE: 136 MMHG

## 2024-08-19 DIAGNOSIS — M06.09 RHEUMATOID ARTHRITIS OF MULTIPLE SITES WITHOUT RHEUMATOID FACTOR (HCC): ICD-10-CM

## 2024-08-19 DIAGNOSIS — M25.562 CHRONIC PAIN OF LEFT KNEE: ICD-10-CM

## 2024-08-19 DIAGNOSIS — M65.30 TRIGGER FINGER OF LEFT HAND, UNSPECIFIED FINGER: ICD-10-CM

## 2024-08-19 DIAGNOSIS — M15.0 PRIMARY OSTEOARTHRITIS INVOLVING MULTIPLE JOINTS: ICD-10-CM

## 2024-08-19 DIAGNOSIS — G89.29 CHRONIC PAIN OF LEFT KNEE: ICD-10-CM

## 2024-08-19 DIAGNOSIS — Z79.899 ENCOUNTER FOR DRUG THERAPY: ICD-10-CM

## 2024-08-19 DIAGNOSIS — M06.09 RHEUMATOID ARTHRITIS OF MULTIPLE SITES WITHOUT RHEUMATOID FACTOR (HCC): Primary | ICD-10-CM

## 2024-08-19 LAB
ALBUMIN SERPL-MCNC: 4.4 G/DL (ref 3.2–4.8)
ALBUMIN/GLOB SERPL: 1.5 {RATIO} (ref 1–2)
ALP LIVER SERPL-CCNC: 77 U/L
ALT SERPL-CCNC: 51 U/L
ANION GAP SERPL CALC-SCNC: 7 MMOL/L (ref 0–18)
AST SERPL-CCNC: 31 U/L (ref ?–34)
BASOPHILS # BLD AUTO: 0.05 X10(3) UL (ref 0–0.2)
BASOPHILS NFR BLD AUTO: 0.8 %
BILIRUB SERPL-MCNC: 0.7 MG/DL (ref 0.2–1.1)
BUN BLD-MCNC: 15 MG/DL (ref 9–23)
CALCIUM BLD-MCNC: 9.6 MG/DL (ref 8.7–10.4)
CHLORIDE SERPL-SCNC: 106 MMOL/L (ref 98–112)
CO2 SERPL-SCNC: 27 MMOL/L (ref 21–32)
CREAT BLD-MCNC: 1.15 MG/DL
EGFRCR SERPLBLD CKD-EPI 2021: 73 ML/MIN/1.73M2 (ref 60–?)
EOSINOPHIL # BLD AUTO: 0.19 X10(3) UL (ref 0–0.7)
EOSINOPHIL NFR BLD AUTO: 3 %
ERYTHROCYTE [DISTWIDTH] IN BLOOD BY AUTOMATED COUNT: 15.6 %
ERYTHROCYTE [SEDIMENTATION RATE] IN BLOOD: 19 MM/HR
FASTING STATUS PATIENT QL REPORTED: NO
GLOBULIN PLAS-MCNC: 3 G/DL (ref 2–3.5)
GLUCOSE BLD-MCNC: 79 MG/DL (ref 70–99)
HCT VFR BLD AUTO: 43.9 %
HGB BLD-MCNC: 14.9 G/DL
IMM GRANULOCYTES # BLD AUTO: 0.02 X10(3) UL (ref 0–1)
IMM GRANULOCYTES NFR BLD: 0.3 %
LYMPHOCYTES # BLD AUTO: 2.24 X10(3) UL (ref 1–4)
LYMPHOCYTES NFR BLD AUTO: 35.3 %
MCH RBC QN AUTO: 30.1 PG (ref 26–34)
MCHC RBC AUTO-ENTMCNC: 33.9 G/DL (ref 31–37)
MCV RBC AUTO: 88.7 FL
MONOCYTES # BLD AUTO: 0.77 X10(3) UL (ref 0.1–1)
MONOCYTES NFR BLD AUTO: 12.1 %
NEUTROPHILS # BLD AUTO: 3.08 X10 (3) UL (ref 1.5–7.7)
NEUTROPHILS # BLD AUTO: 3.08 X10(3) UL (ref 1.5–7.7)
NEUTROPHILS NFR BLD AUTO: 48.5 %
OSMOLALITY SERPL CALC.SUM OF ELEC: 290 MOSM/KG (ref 275–295)
PLATELET # BLD AUTO: 224 10(3)UL (ref 150–450)
POTASSIUM SERPL-SCNC: 4.2 MMOL/L (ref 3.5–5.1)
PROT SERPL-MCNC: 7.4 G/DL (ref 5.7–8.2)
RBC # BLD AUTO: 4.95 X10(6)UL
SODIUM SERPL-SCNC: 140 MMOL/L (ref 136–145)
WBC # BLD AUTO: 6.4 X10(3) UL (ref 4–11)

## 2024-08-19 PROCEDURE — 3078F DIAST BP <80 MM HG: CPT | Performed by: INTERNAL MEDICINE

## 2024-08-19 PROCEDURE — 3075F SYST BP GE 130 - 139MM HG: CPT | Performed by: INTERNAL MEDICINE

## 2024-08-19 PROCEDURE — 36415 COLL VENOUS BLD VENIPUNCTURE: CPT

## 2024-08-19 PROCEDURE — 85652 RBC SED RATE AUTOMATED: CPT

## 2024-08-19 PROCEDURE — 80053 COMPREHEN METABOLIC PANEL: CPT

## 2024-08-19 PROCEDURE — 3008F BODY MASS INDEX DOCD: CPT | Performed by: INTERNAL MEDICINE

## 2024-08-19 PROCEDURE — 85025 COMPLETE CBC W/AUTO DIFF WBC: CPT

## 2024-08-19 PROCEDURE — 99214 OFFICE O/P EST MOD 30 MIN: CPT | Performed by: INTERNAL MEDICINE

## 2024-08-19 RX ORDER — METHYLPREDNISOLONE 4 MG/1
TABLET ORAL
Qty: 1 EACH | Refills: 0 | Status: SHIPPED | OUTPATIENT
Start: 2024-08-19

## 2024-08-19 NOTE — PROGRESS NOTES
[facility], [address] [place] [address]      Consult     Omar Roe Patient Status:  No patient class for patient encounter    1965 MRN [id number]   Location [facility], [address] [place] [address] Attending No att. providers found   Hosp Day # 0 PCP YOVANI LIANG     Referring Provider: PCP    Reason for Consultation: Seropositive rheumatoid arthritis    Subjective:    Omar Roe is a 59 year old male with     59-year-old man comes in for followup for seropositive rheumatoid arthritis with incidental chondrocalcinosis of the knees and pseudogout arthritis. Also generalized osteoarthritis.    Patient was last seen in clinic 2024    He has been doing well subsequently.    Trigger finger has been acting up left third digit recently been causing discomfort    Overall hand pain and stiffness as well    Is open to a Medrol Dosepak    Last knee injection was 12 months ago with continued improvement    X-rays of the hands showed degenerative osteoarthritis no erosions from 2024    X-ray of the knee showed moderate to severe arthritis    Would like to continue colchicine 0.6 twice a day    He remains active and exercises regularly. His knee pain is fairly stable    But recently noticed increased stiffness in his left knee and is looking for another knee injection which she had done in May 2022    Previously he has had chronic lesion removed prior to starting Enbrel and was diagnosed with basal cell carcinoma with resection    He is now followed by dermatologist every 6 months    He is not interested in switching Enbrel to another agent that is not anti-TNF    I have discussed if he has recurrence of any skin cancer we need to stop and switch therapy    He understands the risks associated with anti-TNF therapy and increased risk of skin cancers.    He admits to not exercising regularly otherwise.    His son has moved back to [address] [place] [address] from [place] so he is not  traveling as much anymore. He is also busy with his first grandchild    States no overt swelling of the joint itself    He has continued his Enbrel 50 mg subcutaneous every week    And colchicine 0.6 mg once day for CPPD arthritis    He had stopped methotrexate without any worsening symptoms    About 4 years ago.    He continued low-dose colchicine because of chondrocalcinosis and CPPD arthritis.    Labs are normal September 2021    Previously Cervical neck x-rays did show mild degenerative changes. He was not interested in further intervention or physical therapy.    Previously X-rays were obtained which showed moderate to significant osteoarthritis of the left knee. X-rays of the hands and feet showed mild degenerative osteo-arthritis with no erosive disease in April 2018.    He has seen orthopedic surgery in the past who had done steroid injections and Synvisc injections with minimal to no relief years ago. He did receive a left knee injection and May 2018 with significant improvement in his pain he has no further swelling in that knee.    He'll consider a knee replacement once he retires in another year hopefully.    He was doing well otherwise continued on Enbrel 50 mg subcutaneous every week.    Denies any swelling of the joints otherwise his last vectra with low at 17. Denies any night sweats or fevers or chills or infections. No shortness of breath or chest pain. In general is doing quite well    Recent labs showed normal CBC CMP TSH and PSA levels July 2023      History/Other:        Past Medical History:History reviewed. No pertinent past medical history.     Past Surgical History: History reviewed. No pertinent surgical history.    Social History:  reports that he has never smoked. He has never used smokeless tobacco. He reports current alcohol use. He reports that he does not use drugs.    Family History: History reviewed. No pertinent family history.    Allergies:   Allergies   Allergen Reactions     Penicillins RASH     As a child.     As a child.       Current Medications:  Current Outpatient Medications   Medication Sig Dispense Refill    methylPREDNISolone (MEDROL) 4 MG Oral Tablet Therapy Pack As directed. 1 each 0    Etanercept (ENBREL SURECLICK) 50 MG/ML Subcutaneous Solution Auto-injector Inject 50 mg into the skin every 7 days. 12 mL 5    colchicine 0.6 MG Oral Tab Take 1 tablet (0.6 mg total) by mouth 2 (two) times daily as needed. 180 tablet 3    SYNTHROID 75 MCG Oral Tab Take 1 tablet (75 mcg total) by mouth before breakfast.      clobetasol 0.05 % External Cream Apply topically 2 (two) times daily. As needed        No current facility-administered medications for this visit.       (Not in a hospital admission)      Review of Systems:     Constitutional: Negative for chills, , fatigue, fever and unexpected weight change.    HENT: Negative for congestion, and mouth sores.    Eyes: Negative for photophobia, pain, redness and visual disturbance.    Respiratory: Negative for apnea, cough, chest tightness, shortness of breath, wheezing and stridor.    Cardiovascular: Negative for chest pain, palpitations and leg swelling.    Gastrointestinal: Negative for abdominal distention, abdominal pain, blood in stool, constipation, diarrhea and nausea.    Endocrine: Negative.     Genitourinary: Negative for decreased urine volume, difficulty urinating, dyspareunia, dysuria, flank pain, and frequency.    Musculoskeletal: + arthralgias, no gait problem and joint swelling.    Skin: Negative for color change, pallor and rash. No raynauds or digital ulcerations no sclerodactly.    Allergic/Immunologic: Negative.    Neurological: Negative for dizziness, tremors, seizures, syncope, speech difficulty, weakness, light-headedness, numbness and headaches.    Hematological: Does not bruise/bleed easily.    Psychiatric/Behavioral: Negative for confusion, decreased concentration, hallucinations, self-injury, sleep disturbance  and suicidal ideas or depression.    Objective:   [unfilled]  Vitals:    08/19/24 1033   BP: 136/64   Pulse: 91   Temp: 97.8 °F (36.6 °C)          Constitutional: is oriented to person, place, and time. Appears well-developed and well-nourished. No distress.    HEENT: Normocephalic; EOMI; no jvd; no LAD; no oral or nasal ulcers.     Eyes: Conjunctivae and EOM are normal. Pupils are equal, round, and reactive to light.     Neck: Normal range of motion. No thyromegaly present.    Cardiovascular: RRR, no murmurs.    Lungs: Clear, Bilateral air entry, no wheezes.    Abdominal: Soft.    Musculoskeletal:         Joint Exam 08/19/2024        Right  Left   PIP 3 (finger)      Tender        Swollen: 0      Tender: 1          Right shoulder: Exhibits normal range of motion on abduction and internal rotation, no tenderness, no bony tenderness, no deformity, no laceration, no pain and no spasm.        Left shoulder: Exhibits normal range of motion on abduction and internal and external rotation.  no tenderness, no bony tenderness, no swelling, no effusion, no deformity, no pain, no spasm and normal strength.        Right elbow:  Exhibits normal range of motion, no swelling, no effusion and no deformity. No tenderness found. No medial epicondyle, no lateral epicondyle and no olecranon process tenderness noted. There are no contractures or tophi or nodules.        Left elbow:  Normal range of motion, no swelling, no effusion and no deformity. No medial epicondyle, no lateral epicondyle and no olecranon process tenderness noted. There are no contractures or tophi or nodules.        Right wrist:  Exhibits normal range of motion, no tenderness, no bony tenderness, no swelling, no effusion and no crepitus. Flexion and extension intact w/o limitation.        Left wrist: Exhibits normal range of motion, no tenderness, no bony tenderness, no swelling, no effusion, no crepitus and no deformity. Flexion and extension intact without  limitation.        Right hip: Exhibits normal range of motion, normal strength, no tenderness, no bony tenderness, no swelling and no crepitus.        Right hand: No synovitis of MCP,PIP or DIP joints; there are scattered Bouchards and Heberden nodules noted;  strength: 100%.  Moderate squaring first CMC joint        Left hand: No synovitis of MCP,PIP or DIP joints; there are scattered Bouchards and Heberden nodules noted;  strength: 100%.  Moderate squaring first CMC joint; triggering of the third digit of the left finger with limited extension        Left hip: Exhibits normal range of motion, normal strength, no tenderness, no bony tenderness, No swelling and no crepitus.        Right knee: Exhibits normal range of motion, no swelling, no effusion, no ecchymosis, no deformity and no erythema. No tenderness found. No medial joint line, no lateral joint line, no MCL and no LCL tenderness noted. mild crepitation on flexion of knee and extension normal.        Left knee:  Exhibits normal range of motion, no swelling, small effusion, no ecchymosis and no erythema. No tenderness found. No medial joint line, no lateral joint line and no patellar tendon tenderness noted. mod crepitation on flexion of the knee. Extension intact and normal.        Right ankle: No swelling, no deformity. No tenderness. Dorsiflexion and plantar flexion intact without limitation in range of motion.        Left ankle: Exhibits no swelling. No tenderness. No lateral malleolus and no medial malleolus tenderness found. Achilles tendon normal. Achilles tendon exhibits no pain, no defect and normal Catalan's test results.  Dorsiflexion and plantar flexion intact without limitation in range of motion.        Cervical back: Exhibits normal range of motion, no tenderness, no bony tenderness, no swelling, no pain and no spasm.        Thoracic back: Exhibits normal range of motion, no tenderness, no bony tenderness and no spasm.        Lumbar  back:  Exhibits normal range of motion, no tenderness, no bony tenderness, no pain and no spasm.        Right foot: normal. There is normal range of motion, no tenderness, no bony tenderness, no crepitus and no laceration. There is no synovitis or tenderness of the MTP joints to palpation.  Bony enlargement of the first MTP joint        Left foot: normal. There is normal range of motion, no tenderness, no bony tenderness and no crepitus. There is no synovitis or tenderness of the MTP joints to palpation.  Bony enlargement of the first MTP joint    Lymphadenopathy: No submental, no submandibular, and no occipital adenopathy present, has no cervical adenopathy or axillary lympadenopathy.    Neurological: Alert and oriented. No focal motor or sensory abnormalities. Strength is 5/5 Upper Extremities/Lower Extremities proximally and distally.    Skin: Skin is warm, dry and intact.    Psychiatric: Normal behavior.    Results:    Labs:    CBC CMP normal TSH normal PSA normal July 2023    TB testing normal November 2022    TB testing normal February 2024 CBC noted CMP noted creatinine normal ESR chronically elevated    @LABRCNTIP(RF,B12)@      [unfilled]    Imaging:  Narrative   PROCEDURE:  XR KNEE (1 OR 2 VIEWS), LEFT (CPT=73560)     COMPARISON:  None.     INDICATIONS:  G89.29 Chronic pain of left knee M25.562 Chronic pain of left knee M19.90 Osteoarthritis, unspecified osteoarthritis type, unspecified site M06.09 Rheumatoid a*     PATIENT STATED HISTORY: (As transcribed by Technologist)  Left knee pain and stiffness all over joint, off and on, for past 20yrs.      FINDINGS:  No evidence of acute displaced fracture or dislocation.  Normal mineralization.  Mild-to-moderate tricompartmental osteoarthritic changes noted.  Small suprapatellar joint effusion.                   Impression   CONCLUSION:  No evidence of acute displaced fracture or dislocation in the left knee.  Degenerative changes as above.  Small joint  effusion.     LOCATION:  [id number]        Dictated by (CST): Nir Zuleta MD on 8/22/2023 at 2:07 PM      Finalized by (CST): Nir Zuleta MD on 8/22/2023 at 2:08 PM       Result History    XR KNEE (1 OR 2 VIEWS), LEFT (CPT=73560) (Order #[id number]) on 8/22/2023 - Order Result History Report  Signed by    Signed Time Phone Pager   Nir Zuleta MD 8/22/2023 1       Narrative   PROCEDURE:  XR HAND (MIN 3 VIEWS) BILAT (CPT=73130)     TECHNIQUE:  A total of 6 views were obtained. Three views of the right hand and three views of the left hand.     COMPARISON:  None.     INDICATIONS:  M19.90 Osteoarthritis, unspecified osteoarthritis type, unspecified site M06.09 Rheumatoid arthritis of multiple sites without rheumatoid factor (HCC)     PATIENT STATED HISTORY: (As transcribed by Technologist)  Bilateral hands pain/stiffness all over, off and on, for past 10yrs. Metallic foreign body in right 4th finger from working with metal about 10yrs ago per patient.      FINDINGS:    RIGHT HAND: No evidence of acute displaced fracture or dislocation.  Osteopenia.  Unremarkable soft tissues.  Mild osteoarthritic changes in the interphalangeal joints, 1st CMC joint, and the radiocarpal joint.  Degenerative cystic formation in lunate  noted.  Accessory ossicle or old avulsion injury along the 1st interphalangeal joint.  Chronic posttraumatic deformity of the mid shaft of the right 5th metacarpal.     LEFT HAND: No evidence of acute displaced fracture or dislocation.  Osteopenia.  Unremarkable soft tissues mild scattered osteoarthritic changes in the interphalangeal joints, 1st CMC joint, and 1st MCP joint.           Reviewed    Imaging:x-rays of the right foot smile small plantar calcaneus spur left foot small calcaneal spur left and right knee x-rays mild degenerative arthritis left and right hand x-rays mild degenerative arthritis left hand x-ray is normal right and x-rays no acute abnormalities    X-ray of the left knee  moderate osteoarthritis April 2018  X-rays of the hands and feet mild generalized osteoarthritis no erosive disease in April 2018     Assessment & Plan:      59-year-old gentleman comes in for reevaluation for:    Seropositive rheumatoid arthritis  Generalized osteoarthritis  Pseudogout arthritis multiple joints  Mild left knee pain likely arthritic in nature  History of basal cell carcinoma status post resection  Trigger finger third digit left hand    Patient has no obvious joint swelling and exam .  X-rays of the knee did confirm pseudogout and moderate to severe osteoarthritis of the left knee. Responded to steroid injection May 2018.  Continue colchicine 0.6 mg daily. Risks side effects discussed.  Left knee steroid injection August 2023    The severity of his arthritis she will likely need a knee replacement in the future.   Avoid NSAIDS bc of hx of abn LFT  Avoid alcohol intake  Also suggest quadriceps strengthening exercising.  Continue Enbrel 50 mg subcutaneous every week    Moderate to severe triggering left third digit will refer to Ortho information given to Dr. MUNIZ    Recent diagnosis of basal cell carcinoma status post resection. Patient states this has been there prior to Enbrel and he is not interested in switching therapy despite risk of increase skin cancers with anti-TNF treatment. He is getting skin screening every 6 months now.     He understands potential risks of treatment. He understands if he gets another skin cancer we need to switch treatment    TB testing normal February 2024 CBC CMP normal February 2024    Update labs today to monitor for drug toxicity    Status post trigger finger injection third middle finger right hand (resolution of symptoms 11/20)    Left knee injection in August 2023 with continued improvement.  Recommended seeing Ortho at some point if pain worsens      Education and counseling provided to patient.  Instructed patient to call my office or seek medical  attention immediately if symptoms worsen. Risks and side effects of medications and diagnosis discussed in detail and patient was given written information on new prescribed medications.    Return to clinic:  Return in about 6 months (around 2/19/2025).    Donna Ruiz MD  8/22/2023

## 2024-11-25 RX ORDER — MEDROXYPROGESTERONE ACETATE 150 MG/ML
50 INJECTION, SUSPENSION INTRAMUSCULAR
Qty: 12 ML | Refills: 5 | Status: SHIPPED | OUTPATIENT
Start: 2024-11-25

## 2024-11-25 NOTE — TELEPHONE ENCOUNTER
Last office visit: 8/19/2024      Next Rheum Apt:2/10/2025 Donna Ruiz MD    Last fill: 10/14/2024   12 ml,  84 days supply     Labs:   Lab Results   Component Value Date    CREATSERUM 1.15 08/19/2024    ALKPHO 77 08/19/2024    AST 31 08/19/2024    ALT 51 (H) 08/19/2024    BILT 0.7 08/19/2024    TP 7.4 08/19/2024    ALB 4.4 08/19/2024       Lab Results   Component Value Date    WBC 6.4 08/19/2024    HGB 14.9 08/19/2024    .0 08/19/2024    NEPRELIM 3.08 08/19/2024    NEPERCENT 48.5 08/19/2024    LYPERCENT 35.3 08/19/2024    NE 3.08 08/19/2024    LYMABS 2.24 08/19/2024

## 2025-01-08 DIAGNOSIS — M06.09 RHEUMATOID ARTHRITIS OF MULTIPLE SITES WITHOUT RHEUMATOID FACTOR (HCC): Primary | ICD-10-CM

## 2025-01-08 RX ORDER — COLCHICINE 0.6 MG/1
0.6 TABLET ORAL 2 TIMES DAILY PRN
Qty: 180 TABLET | Refills: 1 | Status: SHIPPED | OUTPATIENT
Start: 2025-01-08

## 2025-01-08 NOTE — TELEPHONE ENCOUNTER
Last office visit: 8/19/24    Next Rheum Apt:2/10/2025 Donna Ruiz MD    Last fill: 1/15/24    Labs:   Lab Results   Component Value Date    CREATSERUM 1.15 08/19/2024    ALKPHO 77 08/19/2024    AST 31 08/19/2024    ALT 51 (H) 08/19/2024    BILT 0.7 08/19/2024    TP 7.4 08/19/2024    ALB 4.4 08/19/2024       Lab Results   Component Value Date    WBC 6.4 08/19/2024    HGB 14.9 08/19/2024    .0 08/19/2024    NEPRELIM 3.08 08/19/2024    NEPERCENT 48.5 08/19/2024    LYPERCENT 35.3 08/19/2024    NE 3.08 08/19/2024    LYMABS 2.24 08/19/2024

## 2025-02-10 ENCOUNTER — OFFICE VISIT (OUTPATIENT)
Dept: RHEUMATOLOGY | Facility: CLINIC | Age: 60
End: 2025-02-10
Payer: COMMERCIAL

## 2025-02-10 ENCOUNTER — LAB ENCOUNTER (OUTPATIENT)
Dept: LAB | Age: 60
End: 2025-02-10
Attending: INTERNAL MEDICINE
Payer: COMMERCIAL

## 2025-02-10 VITALS
OXYGEN SATURATION: 98 % | SYSTOLIC BLOOD PRESSURE: 134 MMHG | RESPIRATION RATE: 16 BRPM | DIASTOLIC BLOOD PRESSURE: 80 MMHG | BODY MASS INDEX: 30.66 KG/M2 | WEIGHT: 207 LBS | TEMPERATURE: 97 F | HEART RATE: 72 BPM | HEIGHT: 69 IN

## 2025-02-10 DIAGNOSIS — M25.562 CHRONIC PAIN OF LEFT KNEE: ICD-10-CM

## 2025-02-10 DIAGNOSIS — G89.29 CHRONIC PAIN OF LEFT KNEE: ICD-10-CM

## 2025-02-10 DIAGNOSIS — Z79.899 ENCOUNTER FOR DRUG THERAPY: ICD-10-CM

## 2025-02-10 DIAGNOSIS — M15.0 PRIMARY OSTEOARTHRITIS INVOLVING MULTIPLE JOINTS: ICD-10-CM

## 2025-02-10 DIAGNOSIS — M06.09 RHEUMATOID ARTHRITIS OF MULTIPLE SITES WITHOUT RHEUMATOID FACTOR (HCC): Primary | ICD-10-CM

## 2025-02-10 DIAGNOSIS — M06.09 RHEUMATOID ARTHRITIS OF MULTIPLE SITES WITHOUT RHEUMATOID FACTOR (HCC): ICD-10-CM

## 2025-02-10 LAB
ALBUMIN SERPL-MCNC: 4.5 G/DL (ref 3.2–4.8)
ALBUMIN/GLOB SERPL: 1.5 {RATIO} (ref 1–2)
ALP LIVER SERPL-CCNC: 74 U/L
ALT SERPL-CCNC: 60 U/L
ANION GAP SERPL CALC-SCNC: 9 MMOL/L (ref 0–18)
AST SERPL-CCNC: 38 U/L (ref ?–34)
BASOPHILS # BLD AUTO: 0.04 X10(3) UL (ref 0–0.2)
BASOPHILS NFR BLD AUTO: 0.7 %
BILIRUB SERPL-MCNC: 0.6 MG/DL (ref 0.2–1.1)
BUN BLD-MCNC: 14 MG/DL (ref 9–23)
CALCIUM BLD-MCNC: 9.8 MG/DL (ref 8.7–10.6)
CHLORIDE SERPL-SCNC: 102 MMOL/L (ref 98–112)
CO2 SERPL-SCNC: 27 MMOL/L (ref 21–32)
CREAT BLD-MCNC: 1.12 MG/DL
EGFRCR SERPLBLD CKD-EPI 2021: 75 ML/MIN/1.73M2 (ref 60–?)
EOSINOPHIL # BLD AUTO: 0.17 X10(3) UL (ref 0–0.7)
EOSINOPHIL NFR BLD AUTO: 3.1 %
ERYTHROCYTE [DISTWIDTH] IN BLOOD BY AUTOMATED COUNT: 14.3 %
ERYTHROCYTE [SEDIMENTATION RATE] IN BLOOD: 19 MM/HR
FASTING STATUS PATIENT QL REPORTED: NO
GLOBULIN PLAS-MCNC: 3.1 G/DL (ref 2–3.5)
GLUCOSE BLD-MCNC: 96 MG/DL (ref 70–99)
HCT VFR BLD AUTO: 45 %
HGB BLD-MCNC: 15.3 G/DL
IMM GRANULOCYTES # BLD AUTO: 0.01 X10(3) UL (ref 0–1)
IMM GRANULOCYTES NFR BLD: 0.2 %
LYMPHOCYTES # BLD AUTO: 1.81 X10(3) UL (ref 1–4)
LYMPHOCYTES NFR BLD AUTO: 32.6 %
MCH RBC QN AUTO: 29.9 PG (ref 26–34)
MCHC RBC AUTO-ENTMCNC: 34 G/DL (ref 31–37)
MCV RBC AUTO: 88.1 FL
MONOCYTES # BLD AUTO: 0.62 X10(3) UL (ref 0.1–1)
MONOCYTES NFR BLD AUTO: 11.2 %
NEUTROPHILS # BLD AUTO: 2.9 X10 (3) UL (ref 1.5–7.7)
NEUTROPHILS # BLD AUTO: 2.9 X10(3) UL (ref 1.5–7.7)
NEUTROPHILS NFR BLD AUTO: 52.2 %
OSMOLALITY SERPL CALC.SUM OF ELEC: 286 MOSM/KG (ref 275–295)
PLATELET # BLD AUTO: 274 10(3)UL (ref 150–450)
POTASSIUM SERPL-SCNC: 4.6 MMOL/L (ref 3.5–5.1)
PROT SERPL-MCNC: 7.6 G/DL (ref 5.7–8.2)
RBC # BLD AUTO: 5.11 X10(6)UL
SODIUM SERPL-SCNC: 138 MMOL/L (ref 136–145)
VIT D+METAB SERPL-MCNC: 28.8 NG/ML (ref 30–100)
WBC # BLD AUTO: 5.6 X10(3) UL (ref 4–11)

## 2025-02-10 PROCEDURE — 80053 COMPREHEN METABOLIC PANEL: CPT

## 2025-02-10 PROCEDURE — 82306 VITAMIN D 25 HYDROXY: CPT

## 2025-02-10 PROCEDURE — 85652 RBC SED RATE AUTOMATED: CPT

## 2025-02-10 PROCEDURE — 86480 TB TEST CELL IMMUN MEASURE: CPT

## 2025-02-10 PROCEDURE — 36415 COLL VENOUS BLD VENIPUNCTURE: CPT

## 2025-02-10 PROCEDURE — 85025 COMPLETE CBC W/AUTO DIFF WBC: CPT

## 2025-02-10 RX ORDER — DICYCLOMINE HYDROCHLORIDE 10 MG/1
10 CAPSULE ORAL 2 TIMES DAILY
COMMUNITY
Start: 2024-12-07

## 2025-02-10 RX ORDER — OMEPRAZOLE 40 MG/1
40 CAPSULE, DELAYED RELEASE ORAL DAILY
COMMUNITY
Start: 2025-01-30

## 2025-02-10 RX ORDER — CELECOXIB 200 MG/1
200 CAPSULE ORAL DAILY
Qty: 30 CAPSULE | Refills: 2 | Status: SHIPPED | OUTPATIENT
Start: 2025-02-10

## 2025-02-10 RX ORDER — TRIAMCINOLONE ACETONIDE 40 MG/ML
40 INJECTION, SUSPENSION INTRA-ARTICULAR; INTRAMUSCULAR ONCE
Status: COMPLETED | OUTPATIENT
Start: 2025-02-10 | End: 2025-02-10

## 2025-02-10 RX ADMIN — TRIAMCINOLONE ACETONIDE 40 MG: 40 INJECTION, SUSPENSION INTRA-ARTICULAR; INTRAMUSCULAR at 10:47:00

## 2025-02-10 NOTE — PATIENT INSTRUCTIONS
OSTEOARTHRITIS    Fast Facts    Though some of the joint changes are irreversible, most patients will not need joint replacement surgery.    OA symptoms (what you feel) can vary greatly among patients.    A rheumatologist can detect arthritis and prescribe the proper treatment. The goal of treatment in OA is to reduce pain and improve function.    Exercise is an important part of OA treatment, because it can decrease joint pain and improve function.    At present, there is no treatment that can reverse the damage of OA in the joints. Researchers are trying to find ways to slow or reverse this joint damage.    Osteoarthritis (also known as OA) is a common joint disease that most often affects middle-age to elderly people. It is commonly referred to as \"wear and tear\" of the joints, but we now know that OA is a disease of the entire joint, involving the cartilage, joint lining, ligaments, and bone. Although it is more common in older people, it is not really accurate to say that the joints are just \"wearing out.\" It is characterized by breakdown of the cartilage (the tissue that cushions the ends of the bones between joints), bony changes of the joints, deterioration of tendons and ligaments, and various degrees of inflammation of the joint lining (called the synovium).    This arthritis tends to occur in the hand joints, spine, hips, knees, and great toes. The lifetime risk of developing OA of the knee is about 46%, and the lifetime risk of developing OA of the hip is 25%, according to the [facility] Osteoarthritis Project, a long-term study from the [facility] and sponsored by the Centers for Disease Control and Prevention (often called the CDC) and the National Institutes of Health.    OA is a top cause of disability in older people. The goal of osteoarthritis treatment is to reduce pain and improve function. There is no cure for the disease, but some treatments attempt to slow disease  progression.         What is osteoarthritis?    OA is a frequently slowly progressive joint disease typically seen in middle-aged to elderly people. In osteoarthritis, the cartilage between the bones in the joint breaks down. This causes the affected bones to slowly get bigger. The joint cartilage often breaks down because of mechanical stress or biochemical changes within the body, causing the bone underneath to fail. OA can occur together with other types of arthritis, such as gout or rheumatoid arthritis.    OA tends to affect commonly used joints such as the hands and spine, and the weight-bearing joints such as the hips and knees. Symptoms include:    Joint pain and stiffness    Knobby swelling at the joint    Cracking or grinding noise with joint movement    Decreased function of the joint    How do you treat osteoarthritis?    There is no proven treatment yet that can reverse joint damage from OA. The goal of osteoarthritis treatment is to reduce pain and improve function of the affected joints. Most often, this is possible with a mixture of physical measures and drug therapy and, sometimes, surgery.    Physical measures: Weight loss and exercise are useful in OA. Excess weight puts stress on your knee joints and hips and low back. For every 10 pounds of weight you lose over 10 years, you can reduce the chance of developing knee OA by up to 50 percent. Exercise can improve your muscle strength, decrease joint pain and stiffness, and lower the chance of disability due to OA. Also helpful are support (\"assistive\") devices, such as orthotics or a walking cane, that help you do daily activities. Heat or cold therapy can help relieve OA symptoms for a short time.    Certain alternative treatments such as spa (hot tub), massage, and chiropractic manipulation can help relieve pain for a short time. They can be costly, though, and require repeated treatments. Also, the long-term benefits of these alternative  (sometimes called complementary or integrative) medicine treatments are unproven but are under study.    Drug therapy: Forms of drug therapy include topical, oral (by mouth) and injections (shots). You apply topical drugs directly on the skin over the affected joints. These medicines include capsaicin cream, lidocaine and diclofenac gel. Oral pain relievers such as acetaminophen are common first treatments. So are nonsteroidal anti-inflammatory drugs (often called NSAIDs), which decrease swelling and pain.    In 2010, the government (FDA) approved the use of duloxetine (Cymbalta) for chronic (long-term) musculoskeletal pain including from OA. This oral drug is not new. It also is in use for other health concerns, such as mood disorders, nerve pain and fibromyalgia.    Patients with more serious pain may need stronger medications, such as prescription narcotics.    Joint injections with corticosteroids (sometimes called cortisone shots) or with a form of lubricant called hyaluronic acid can give months of pain relief from OA. This lubricant is given in the knee, and these shots may help delay the need for a knee replacement by a few years in some patients.    Surgery: Surgical treatment becomes an option for severe cases. This includes when the joint has serious damage, or when medical treatment fails to relieve pain and you have major loss of function. Surgery may involve arthroscopy, repair of the joint done through small incisions (cuts). If the joint damage cannot be repaired, you may need a joint replacement.    Supplements: Many over-the-counter nutrition supplements have been used for osteoarthritis treatment. Most lack good research data to support their effectiveness and safety. Among the most widely used are calcium, vitamin D and omega-3 fatty acids. To ensure safety and avoid drug interactions, consult your doctor or pharmacist before using any of these supplements. This is especially true when you are  combining these supplements with prescribed

## 2025-02-10 NOTE — PROGRESS NOTES
[facility], [address] [place] [address]      Consult     Omar Roe Patient Status:  No patient class for patient encounter    1965 MRN [id number]   Location [facility], [address] [place] [address] Attending No att. providers found   Hosp Day # 0 PCP YOVANI LIANG     Referring Provider: PCP    Reason for Consultation: Seropositive rheumatoid arthritis; osteoarthritis    Subjective:    Omar Roe is a 59 year old male with     59-year-old man comes in for followup for seropositive rheumatoid arthritis with incidental chondrocalcinosis of the knees and pseudogout arthritis. Also generalized osteoarthritis.    Patient was last seen in clinic 2024    He has been doing well subsequently.    Trigger finger has been acting up left third digit recently been causing discomfort in his CMC joint which he did get injections for by Ortho hand with some improvement    He has noticed increased stiffness in his left knee would like to do another knee injection he is heading to [place] shortly.  He is hoping to get a replacement at some point possibly in the winter of later this year    Last knee injection was 12 months ago in     X-rays of the hands showed degenerative osteoarthritis no erosions from 2024    X-ray of the knee showed moderate to severe arthritis    Would like to continue colchicine 0.6 twice a day    He remains active and exercises regularly. His knee pain is fairly stable    But recently noticed increased stiffness in his left knee and is looking for another knee injection     Previously he has had chronic lesion removed prior to starting Enbrel and was diagnosed with basal cell carcinoma with resection    He is now followed by dermatologist every 6 months    He is not interested in switching Enbrel to another agent that is not anti-TNF    I have discussed if he has recurrence of any skin cancer we need to stop and switch therapy    He understands  the risks associated with anti-TNF therapy and increased risk of skin cancers.    He admits to not exercising regularly otherwise.    His son has moved back to [address] [place] [address] from [place] so he is not traveling as much anymore. He is also busy with his first grandchild    States no overt swelling of the joint itself    He has continued his Enbrel 50 mg subcutaneous every week    And colchicine 0.6 mg once day for CPPD arthritis    He had stopped methotrexate without any worsening symptoms    About 2021    He continued low-dose colchicine because of chondrocalcinosis and CPPD arthritis.      Previously Cervical neck x-rays did show mild degenerative changes. He was not interested in further intervention or physical therapy.    Previously X-rays were obtained which showed moderate to significant osteoarthritis of the left knee. X-rays of the hands and feet showed mild degenerative osteo-arthritis with no erosive disease in April 2018.    He has seen orthopedic surgery in the past who had done steroid injections and Synvisc injections with minimal to no relief years ago. He did receive a left knee injection and May 2018 with significant improvement in his pain he has no further swelling in that knee.    He'll consider a knee replacement once he retires in another year hopefully.    He was doing well otherwise continued on Enbrel 50 mg subcutaneous every week.    Denies any swelling of the joints otherwise his last vectra with low at 17.     Denies any night sweats or fevers or chills or infections.     No shortness of breath or chest pain. In general is doing quite well    He is busy with his grandchildren who are fairly young and is hoping his wife retires in the near future so they can travel together.  She works in finances    History/Other:        Past Medical History:History reviewed. No pertinent past medical history.     Past Surgical History: History reviewed. No pertinent surgical history.    Social History:   reports that he has never smoked. He has never used smokeless tobacco. He reports current alcohol use. He reports that he does not use drugs.    Family History: History reviewed. No pertinent family history.    Allergies:   Allergies   Allergen Reactions    Penicillins RASH     As a child.     As a child.       Current Medications:  Current Outpatient Medications   Medication Sig Dispense Refill    Omeprazole 40 MG Oral Capsule Delayed Release Take 1 capsule (40 mg total) by mouth daily.      dicyclomine 10 MG Oral Cap Take 1 capsule (10 mg total) by mouth 2 (two) times daily. As needed      celecoxib (CELEBREX) 200 MG Oral Cap Take 1 capsule (200 mg total) by mouth daily. 30 capsule 2    colchicine 0.6 MG Oral Tab Take 1 tablet (0.6 mg total) by mouth 2 (two) times daily as needed. 180 tablet 1    ENBREL SURECLICK 50 MG/ML Subcutaneous Solution Auto-injector INJECT 50 MG (1 ML) UNDER THE SKIN EVERY 7 DAYS 12 mL 5    SYNTHROID 75 MCG Oral Tab Take 1 tablet (75 mcg total) by mouth before breakfast.      clobetasol 0.05 % External Cream Apply topically 2 (two) times daily. As needed        No current facility-administered medications for this visit.       (Not in a hospital admission)      Review of Systems:     Constitutional: Negative for chills, , fatigue, fever and unexpected weight change.    HENT: Negative for congestion, and mouth sores.    Eyes: Negative for photophobia, pain, redness and visual disturbance.    Respiratory: Negative for apnea, cough, chest tightness, shortness of breath, wheezing and stridor.    Cardiovascular: Negative for chest pain, palpitations and leg swelling.    Gastrointestinal: Negative for abdominal distention, abdominal pain, blood in stool, constipation, diarrhea and nausea.    Endocrine: Negative.     Genitourinary: Negative for decreased urine volume, difficulty urinating, dyspareunia, dysuria, flank pain, and frequency.    Musculoskeletal: + arthralgias, no gait problem and  joint swelling.    Skin: Negative for color change, pallor and rash. No raynauds or digital ulcerations no sclerodactly.    Allergic/Immunologic: Negative.    Neurological: Negative for dizziness, tremors, seizures, syncope, speech difficulty, weakness, light-headedness, numbness and headaches.    Hematological: Does not bruise/bleed easily.    Psychiatric/Behavioral: Negative for confusion, decreased concentration, hallucinations, self-injury, sleep disturbance and suicidal ideas or depression.    Objective:   [unfilled]  Vitals:    02/10/25 1029   BP: 134/80   Pulse: 72   Resp: 16   Temp: 97.2 °F (36.2 °C)      Wt Readings from Last 6 Encounters:   02/10/25 207 lb (93.9 kg)   08/19/24 203 lb (92.1 kg)   02/19/24 204 lb (92.5 kg)   08/22/23 205 lb (93 kg)       Body mass index is 30.57 kg/m².      Constitutional: is oriented to person, place, and time. Appears well-developed and well-nourished. No distress.    HEENT: Normocephalic; EOMI; no jvd; no LAD; no oral or nasal ulcers.     Eyes: Conjunctivae and EOM are normal. Pupils are equal, round, and reactive to light.     Neck: Normal range of motion. No thyromegaly present.    Cardiovascular: RRR, no murmurs.    Lungs: Clear, Bilateral air entry, no wheezes.    Abdominal: Soft.    Musculoskeletal:         Joint Exam 02/10/2025        Right  Left   CMC   Tender      Knee     Swollen Tender        Swollen: 1      Tender: 2          Right shoulder: Exhibits normal range of motion on abduction and internal rotation, no tenderness, no bony tenderness, no deformity, no laceration, no pain and no spasm.        Left shoulder: Exhibits normal range of motion on abduction and internal and external rotation.  no tenderness, no bony tenderness, no swelling, no effusion, no deformity, no pain, no spasm and normal strength.        Right elbow:  Exhibits normal range of motion, no swelling, no effusion and no deformity. No tenderness found. No medial epicondyle, no lateral  epicondyle and no olecranon process tenderness noted. There are no contractures or tophi or nodules.        Left elbow:  Normal range of motion, no swelling, no effusion and no deformity. No medial epicondyle, no lateral epicondyle and no olecranon process tenderness noted. There are no contractures or tophi or nodules.        Right wrist:  Exhibits normal range of motion, no tenderness, no bony tenderness, no swelling, no effusion and no crepitus. Flexion and extension intact w/o limitation.        Left wrist: Exhibits normal range of motion, no tenderness, no bony tenderness, no swelling, no effusion, no crepitus and no deformity. Flexion and extension intact without limitation.        Right hip: Exhibits normal range of motion, normal strength, no tenderness, no bony tenderness, no swelling and no crepitus.        Right hand: No synovitis of MCP,PIP or DIP joints; there are scattered Bouchards and Heberden nodules noted;  strength: 100%.  Moderate squaring first CMC joint        Left hand: No synovitis of MCP,PIP or DIP joints; there are scattered Bouchards and Heberden nodules noted;  strength: 100%.  Moderate squaring first CMC joint; triggering of the third digit of the left finger with limited extension        Left hip: Exhibits normal range of motion, normal strength, no tenderness, no bony tenderness, No swelling and no crepitus.        Right knee: Exhibits normal range of motion, no swelling, no effusion, no ecchymosis, no deformity and no erythema. No tenderness found. No medial joint line, no lateral joint line, no MCL and no LCL tenderness noted. mild crepitation on flexion of knee and extension normal.        Left knee:  Exhibits normal range of motion, no swelling, small effusion, no ecchymosis and no erythema. No tenderness found. No medial joint line, no lateral joint line and no patellar tendon tenderness noted. mod crepitation on flexion of the knee. Extension intact and normal.         Right ankle: No swelling, no deformity. No tenderness. Dorsiflexion and plantar flexion intact without limitation in range of motion.        Left ankle: Exhibits no swelling. No tenderness. No lateral malleolus and no medial malleolus tenderness found. Achilles tendon normal. Achilles tendon exhibits no pain, no defect and normal Catalan's test results.  Dorsiflexion and plantar flexion intact without limitation in range of motion.        Cervical back: Exhibits normal range of motion, no tenderness, no bony tenderness, no swelling, no pain and no spasm.        Thoracic back: Exhibits normal range of motion, no tenderness, no bony tenderness and no spasm.        Lumbar back:  Exhibits normal range of motion, no tenderness, no bony tenderness, no pain and no spasm.        Right foot: normal. There is normal range of motion, no tenderness, no bony tenderness, no crepitus and no laceration. There is no synovitis or tenderness of the MTP joints to palpation.  Bony enlargement of the first MTP joint        Left foot: normal. There is normal range of motion, no tenderness, no bony tenderness and no crepitus. There is no synovitis or tenderness of the MTP joints to palpation.  Bony enlargement of the first MTP joint    Lymphadenopathy: No submental, no submandibular, and no occipital adenopathy present, has no cervical adenopathy or axillary lympadenopathy.    Neurological: Alert and oriented. No focal motor or sensory abnormalities. Strength is 5/5 Upper Extremities/Lower Extremities proximally and distally.    Skin: Skin is warm, dry and intact.    Psychiatric: Normal behavior.    Results:    Labs:    CBC CMP normal TSH normal PSA normal July 2023    TB testing normal November 2022    TB testing normal February 2024 CBC noted CMP noted creatinine normal ESR chronically elevated    @LABRCNTIP(RF,B12)@      [unfilled]    Imaging:  Narrative   PROCEDURE:  XR KNEE (1 OR 2 VIEWS), LEFT (CPT=73560)     COMPARISON:  None.      INDICATIONS:  G89.29 Chronic pain of left knee M25.562 Chronic pain of left knee M19.90 Osteoarthritis, unspecified osteoarthritis type, unspecified site M06.09 Rheumatoid a*     PATIENT STATED HISTORY: (As transcribed by Technologist)  Left knee pain and stiffness all over joint, off and on, for past 20yrs.      FINDINGS:  No evidence of acute displaced fracture or dislocation.  Normal mineralization.  Mild-to-moderate tricompartmental osteoarthritic changes noted.  Small suprapatellar joint effusion.                   Impression   CONCLUSION:  No evidence of acute displaced fracture or dislocation in the left knee.  Degenerative changes as above.  Small joint effusion.     LOCATION:  [facility]        Dictated by (CST): Nir Zuleta MD on 8/22/2023 at 2:07 PM      Finalized by (CST): Nir Zuleta MD on 8/22/2023 at 2:08 PM       Result History    XR KNEE (1 OR 2 VIEWS), LEFT (CPT=73560) (Order #[id number]) on 8/22/2023 - Order Result History Report  Signed by    Signed Time Phone Pager   Nir Zuleta MD 8/22/2023 1       Narrative   PROCEDURE:  XR HAND (MIN 3 VIEWS) BILAT (CPT=73130)     TECHNIQUE:  A total of 6 views were obtained. Three views of the right hand and three views of the left hand.     COMPARISON:  None.     INDICATIONS:  M19.90 Osteoarthritis, unspecified osteoarthritis type, unspecified site M06.09 Rheumatoid arthritis of multiple sites without rheumatoid factor (HCC)     PATIENT STATED HISTORY: (As transcribed by Technologist)  Bilateral hands pain/stiffness all over, off and on, for past 10yrs. Metallic foreign body in right 4th finger from working with metal about 10yrs ago per patient.      FINDINGS:    RIGHT HAND: No evidence of acute displaced fracture or dislocation.  Osteopenia.  Unremarkable soft tissues.  Mild osteoarthritic changes in the interphalangeal joints, 1st CMC joint, and the radiocarpal joint.  Degenerative cystic formation in lunate  noted.  Accessory ossicle or  old avulsion injury along the 1st interphalangeal joint.  Chronic posttraumatic deformity of the mid shaft of the right 5th metacarpal.     LEFT HAND: No evidence of acute displaced fracture or dislocation.  Osteopenia.  Unremarkable soft tissues mild scattered osteoarthritic changes in the interphalangeal joints, 1st CMC joint, and 1st MCP joint.           Reviewed    Imaging:x-rays of the right foot smile small plantar calcaneus spur left foot small calcaneal spur left and right knee x-rays mild degenerative arthritis left and right hand x-rays mild degenerative arthritis left hand x-ray is normal right and x-rays no acute abnormalities    X-ray of the left knee moderate osteoarthritis April 2018  X-rays of the hands and feet mild generalized osteoarthritis no erosive disease in April 2018     Assessment & Plan:      59-year-old gentleman comes in for reevaluation for:    Seropositive rheumatoid arthritis  Generalized osteoarthritis  Pseudogout arthritis multiple joints  Moderate left knee pain likely arthritic in nature  History of basal cell carcinoma status post resection      Patient has no obvious joint swelling and exam .  X-rays of the knee did confirm pseudogout and moderate to severe osteoarthritis of the left knee. Responded to steroid injection in 2024  Continue colchicine 0.6 mg daily. Risks side effects discussed.    Left knee steroid injection August 2024    The severity of his arthritis she will likely need a knee replacement in the future.     Avoid NSAIDS bc of hx of abn LFT in the past but he would like to retry this.  Will give him low doses of Celebrex 200 mg daily as needed    Avoid alcohol intake    Also suggest quadriceps strengthening exercising.    Continue Enbrel 50 mg subcutaneous every week    Update labs and TB testing today    Moderate to severe triggering left third digit with injections now followed by Ortho hand    Recent diagnosis of basal cell carcinoma status post resection.  Patient states this has been there prior to Enbrel and he is not interested in switching therapy despite risk of increase skin cancers with anti-TNF treatment. He is getting skin screening every 6 months now.     He understands potential risks of treatment. He understands if he gets another skin cancer we need to switch treatment    TB testing normal February 2024 CBC CMP normal August 2024    Update labs today to monitor for drug toxicity    Left knee injection in August 2023 and patient is opting for left knee injection today.  Written and verbal consent obtained    After consent was obtained, using sterile technique the left knee was prepped and landmarks obtained and sites was marked using lateral technique.The left lateral suprapatellar bursa was palpated as well as patella and quadricepts tendon.  Ethyl chloride spray was used after betadine was used to clean area. plain Lidocaine 1% plain was used as local anesthetic. The joint was entered and 25 gauge needle with 25 inch 1 1/2 needle 1 cc's of *llidocaine 2% was used to anesthesize area and then needle was switched  and 40mg kenalog 1 cc  mg and 1 ml plain Lidocaine was then injected and the needle withdrawn.  The procedure was well tolerated.  The patient is asked to continue to rest the joint for a few more days before resuming regular activities.  It may be more painful for the first 1-2 days.  Watch for fever, or increased swelling or persistent pain in the joint. Call or return to clinic prn if such symptoms occur or there is failure to improve as anticipated.            Education and counseling provided to patient.  Instructed patient to call my office or seek medical attention immediately if symptoms worsen. Risks and side effects of medications and diagnosis discussed in detail and patient was given written information on new prescribed medications.    Return to clinic:  Return in about 6 months (around 8/10/2025).    Donna Ruiz,  MD  8/22/2023

## 2025-02-11 ENCOUNTER — TELEPHONE (OUTPATIENT)
Dept: RHEUMATOLOGY | Facility: CLINIC | Age: 60
End: 2025-02-11

## 2025-02-11 NOTE — TELEPHONE ENCOUNTER
Result Notes       Component  Ref Range & Units 1 d ago 11 mo ago   Vitamin D, 25OH, Total  30.0 - 100.0 ng/mL 28.8 Low  33.4 CM   Comment: Literature Recommendations for 25(OH)D levels are:  Range           Vitamin D Status   <20    ng/mL      Deficiency   20-<30 ng/mL      Insufficiency    ng/mL      Sufficiency   >100   ng/mL      Toxicit          Vitamin D is low at 28.8 would do high-dose vitamin D 50,000 units once a week for 3 months and then over-the-counter at least 3 to 4000 IU D3      Please see previous message

## 2025-02-11 NOTE — TELEPHONE ENCOUNTER
alculated Osmolality  275 - 295 mOsm/kg 286 290 287   eGFR-Cr  >=60 mL/min/1.73m2 75 73 69   AST  <34 U/L 38 High  31 32 R   ALT  10 - 49 U/L 60 High  51 High  7       Patient's liver tests are high    Let him know we cannot do the Celebrex    Likely reason why we did not do it in the past and had to stop it    Any recent alcohol use or NSAID use?  Avoid NSAIDs    Follow-up with PCP to get ultrasound of the liver and further testing    Please fax these labs to patient's primary care physician

## 2025-02-11 NOTE — TELEPHONE ENCOUNTER
Spoke to patient regarding the below message:    alculated Osmolality  275 - 295 mOsm/kg 286 290 287   eGFR-Cr  >=60 mL/min/1.73m2 75 73 69   AST  <34 U/L 38 High  31 32 R   ALT  10 - 49 U/L 60 High  51 High  7         Patient's liver tests are high     Let him know we cannot do the Celebrex     Likely reason why we did not do it in the past and had to stop it     Any recent alcohol use or NSAID use?  Avoid NSAIDs     Follow-up with PCP to get ultrasound of the liver and further testing     Please fax these labs to patient's primary care physician       Patient verbalized understanding and then stated that he was drinking on Sunday for the Super Bowl so that is why his liver tests are high he states. He denies any NSAID use. He states that he just saw his PCP and had lab work done and he was told by his PCP everything was great. He states that he does not feel that he needs an ultrasound done at this time because he believes it was due to the alcohol that he drank on Super Bowl Sunday.     Results faxed over to PCP's office

## 2025-02-11 NOTE — TELEPHONE ENCOUNTER
Spoke to patient regarding the below message:     Result Notes          Component  Ref Range & Units 1 d ago 11 mo ago   Vitamin D, 25OH, Total  30.0 - 100.0 ng/mL 28.8 Low  33.4 CM   Comment: Literature Recommendations for 25(OH)D levels are:  Range           Vitamin D Status   <20    ng/mL      Deficiency   20-<30 ng/mL      Insufficiency    ng/mL      Sufficiency   >100   ng/mL      Toxicit            Vitamin D is low at 28.8 would do high-dose vitamin D 50,000 units once a week for 3 months and then over-the-counter at least 3 to 4000 IU D3        Please see previous message       Patient states that he does not feel that he needs to take high dose Vitamin D at this time because he is leaving tomorrow to go to [place] and he will be there for 10 days and then he will come back and then leave for another 10 days in [place]. He states that he is going to be outside a lot and in the sun so he will get his Vitamin D then.

## 2025-02-12 LAB
M TB IFN-G CD4+ T-CELLS BLD-ACNC: 0 IU/ML
M TB TUBERC IFN-G BLD QL: NEGATIVE
M TB TUBERC IGNF/MITOGEN IGNF CONTROL: >10 IU/ML
QFT TB1 AG MINUS NIL: 0 IU/ML
QFT TB2 AG MINUS NIL: 0 IU/ML

## 2025-03-03 ENCOUNTER — APPOINTMENT (OUTPATIENT)
Dept: URBAN - METROPOLITAN AREA CLINIC 315 | Age: 60
Setting detail: DERMATOLOGY
End: 2025-03-03

## 2025-03-03 DIAGNOSIS — D22 MELANOCYTIC NEVI: ICD-10-CM

## 2025-03-03 DIAGNOSIS — Z12.83 ENCOUNTER FOR SCREENING FOR MALIGNANT NEOPLASM OF SKIN: ICD-10-CM

## 2025-03-03 DIAGNOSIS — L81.4 OTHER MELANIN HYPERPIGMENTATION: ICD-10-CM

## 2025-03-03 DIAGNOSIS — Z85.828 PERSONAL HISTORY OF OTHER MALIGNANT NEOPLASM OF SKIN: ICD-10-CM

## 2025-03-03 PROBLEM — D22.5 MELANOCYTIC NEVI OF TRUNK: Status: ACTIVE | Noted: 2025-03-03

## 2025-03-03 PROCEDURE — 99213 OFFICE O/P EST LOW 20 MIN: CPT

## 2025-03-03 PROCEDURE — OTHER COUNSELING: OTHER

## 2025-03-03 PROCEDURE — OTHER DIAGNOSIS COMMENT: OTHER

## 2025-03-03 PROCEDURE — OTHER RECORDS REVIEWED: OTHER

## 2025-03-03 PROCEDURE — OTHER MIPS QUALITY: OTHER

## 2025-03-03 ASSESSMENT — LOCATION DETAILED DESCRIPTION DERM
LOCATION DETAILED: RIGHT LATERAL TRAPEZIAL NECK
LOCATION DETAILED: LEFT LATERAL NECK
LOCATION DETAILED: SUPERIOR THORACIC SPINE
LOCATION DETAILED: LEFT LATERAL TRAPEZIAL NECK

## 2025-03-03 ASSESSMENT — LOCATION ZONE DERM
LOCATION ZONE: TRUNK
LOCATION ZONE: NECK

## 2025-03-03 ASSESSMENT — LOCATION SIMPLE DESCRIPTION DERM
LOCATION SIMPLE: POSTERIOR NECK
LOCATION SIMPLE: UPPER BACK

## 2025-03-07 ENCOUNTER — TELEPHONE (OUTPATIENT)
Facility: CLINIC | Age: 60
End: 2025-03-07

## 2025-03-07 NOTE — TELEPHONE ENCOUNTER
PA for Enbrel started on Cover My Meds (Key: [id number])  PA Enbrel approved.     Your request has been approved  CaseId:[id number];Status:Approved;Review Type:Prior Auth;Coverage Start Date:02/05/2025;Coverage End Date:03/07/2026;

## 2025-07-07 DIAGNOSIS — M06.09 RHEUMATOID ARTHRITIS OF MULTIPLE SITES WITHOUT RHEUMATOID FACTOR (HCC): ICD-10-CM

## 2025-07-07 RX ORDER — COLCHICINE 0.6 MG/1
0.6 TABLET ORAL 2 TIMES DAILY PRN
Qty: 60 TABLET | Refills: 0 | Status: SHIPPED | OUTPATIENT
Start: 2025-07-07

## 2025-07-07 NOTE — TELEPHONE ENCOUNTER
Colchicine 0.6 mg      Last office visit: 2/10/2025    Next Rheum Apt:8/11/2025 Donna Ruiz MD    Last fill: 1/8/2025 180 tab, 1 refill    Pending 30 day supply and routing to partner since Dr. Ruiz is out of the office.

## 2025-08-05 ENCOUNTER — LAB ENCOUNTER (OUTPATIENT)
Dept: LAB | Age: 60
End: 2025-08-05
Attending: INTERNAL MEDICINE

## 2025-08-05 ENCOUNTER — OFFICE VISIT (OUTPATIENT)
Dept: RHEUMATOLOGY | Facility: CLINIC | Age: 60
End: 2025-08-05

## 2025-08-05 VITALS
RESPIRATION RATE: 16 BRPM | DIASTOLIC BLOOD PRESSURE: 82 MMHG | OXYGEN SATURATION: 96 % | SYSTOLIC BLOOD PRESSURE: 132 MMHG | HEART RATE: 96 BPM | HEIGHT: 69 IN | WEIGHT: 204 LBS | BODY MASS INDEX: 30.21 KG/M2 | TEMPERATURE: 98 F

## 2025-08-05 DIAGNOSIS — M06.09 RHEUMATOID ARTHRITIS OF MULTIPLE SITES WITHOUT RHEUMATOID FACTOR (HCC): ICD-10-CM

## 2025-08-05 DIAGNOSIS — M15.0 PRIMARY OSTEOARTHRITIS INVOLVING MULTIPLE JOINTS: ICD-10-CM

## 2025-08-05 DIAGNOSIS — G89.29 CHRONIC PAIN OF LEFT KNEE: ICD-10-CM

## 2025-08-05 DIAGNOSIS — M65.30 TRIGGER FINGER OF LEFT HAND, UNSPECIFIED FINGER: ICD-10-CM

## 2025-08-05 DIAGNOSIS — Z79.899 ENCOUNTER FOR DRUG THERAPY: ICD-10-CM

## 2025-08-05 DIAGNOSIS — M25.562 CHRONIC PAIN OF LEFT KNEE: ICD-10-CM

## 2025-08-05 DIAGNOSIS — M06.09 RHEUMATOID ARTHRITIS OF MULTIPLE SITES WITHOUT RHEUMATOID FACTOR (HCC): Primary | ICD-10-CM

## 2025-08-05 LAB
ALBUMIN SERPL-MCNC: 4.6 G/DL (ref 3.2–4.8)
ALBUMIN/GLOB SERPL: 1.4 (ref 1–2)
ALP LIVER SERPL-CCNC: 78 U/L (ref 45–117)
ALT SERPL-CCNC: 72 U/L (ref 10–49)
ANION GAP SERPL CALC-SCNC: 5 MMOL/L (ref 0–18)
AST SERPL-CCNC: 42 U/L (ref ?–34)
BASOPHILS # BLD AUTO: 0.06 X10(3) UL (ref 0–0.2)
BASOPHILS NFR BLD AUTO: 0.8 %
BILIRUB SERPL-MCNC: 0.6 MG/DL (ref 0.2–1.1)
BUN BLD-MCNC: 12 MG/DL (ref 9–23)
CALCIUM BLD-MCNC: 9.6 MG/DL (ref 8.7–10.6)
CHLORIDE SERPL-SCNC: 103 MMOL/L (ref 98–112)
CO2 SERPL-SCNC: 31 MMOL/L (ref 21–32)
CREAT BLD-MCNC: 1.29 MG/DL (ref 0.7–1.3)
EGFRCR SERPLBLD CKD-EPI 2021: 63 ML/MIN/1.73M2 (ref 60–?)
EOSINOPHIL # BLD AUTO: 0.17 X10(3) UL (ref 0–0.7)
EOSINOPHIL NFR BLD AUTO: 2.4 %
ERYTHROCYTE [DISTWIDTH] IN BLOOD BY AUTOMATED COUNT: 13.8 %
ERYTHROCYTE [SEDIMENTATION RATE] IN BLOOD: 20 MM/HR (ref 0–20)
FASTING STATUS PATIENT QL REPORTED: NO
GLOBULIN PLAS-MCNC: 3.3 G/DL (ref 2–3.5)
GLUCOSE BLD-MCNC: 94 MG/DL (ref 70–99)
HCT VFR BLD AUTO: 44.8 % (ref 39–53)
HGB BLD-MCNC: 15.2 G/DL (ref 13–17.5)
IMM GRANULOCYTES # BLD AUTO: 0.01 X10(3) UL (ref 0–1)
IMM GRANULOCYTES NFR BLD: 0.1 %
LYMPHOCYTES # BLD AUTO: 2.39 X10(3) UL (ref 1–4)
LYMPHOCYTES NFR BLD AUTO: 33.1 %
MCH RBC QN AUTO: 29.9 PG (ref 26–34)
MCHC RBC AUTO-ENTMCNC: 33.9 G/DL (ref 31–37)
MCV RBC AUTO: 88 FL (ref 80–100)
MONOCYTES # BLD AUTO: 0.74 X10(3) UL (ref 0.1–1)
MONOCYTES NFR BLD AUTO: 10.2 %
NEUTROPHILS # BLD AUTO: 3.85 X10 (3) UL (ref 1.5–7.7)
NEUTROPHILS # BLD AUTO: 3.85 X10(3) UL (ref 1.5–7.7)
NEUTROPHILS NFR BLD AUTO: 53.4 %
OSMOLALITY SERPL CALC.SUM OF ELEC: 288 MOSM/KG (ref 275–295)
PLATELET # BLD AUTO: 286 10(3)UL (ref 150–450)
POTASSIUM SERPL-SCNC: 4.2 MMOL/L (ref 3.5–5.1)
PROT SERPL-MCNC: 7.9 G/DL (ref 5.7–8.2)
RBC # BLD AUTO: 5.09 X10(6)UL (ref 4.3–5.7)
SODIUM SERPL-SCNC: 139 MMOL/L (ref 136–145)
URATE SERPL-MCNC: 4.9 MG/DL (ref 3.7–9.2)
WBC # BLD AUTO: 7.2 X10(3) UL (ref 4–11)

## 2025-08-05 PROCEDURE — 84550 ASSAY OF BLOOD/URIC ACID: CPT

## 2025-08-05 PROCEDURE — 85652 RBC SED RATE AUTOMATED: CPT

## 2025-08-05 PROCEDURE — 85025 COMPLETE CBC W/AUTO DIFF WBC: CPT

## 2025-08-05 PROCEDURE — 99214 OFFICE O/P EST MOD 30 MIN: CPT | Performed by: INTERNAL MEDICINE

## 2025-08-05 PROCEDURE — 3008F BODY MASS INDEX DOCD: CPT | Performed by: INTERNAL MEDICINE

## 2025-08-05 PROCEDURE — 80053 COMPREHEN METABOLIC PANEL: CPT

## 2025-08-05 PROCEDURE — 36415 COLL VENOUS BLD VENIPUNCTURE: CPT

## 2025-08-05 PROCEDURE — 3079F DIAST BP 80-89 MM HG: CPT | Performed by: INTERNAL MEDICINE

## 2025-08-05 PROCEDURE — 20610 DRAIN/INJ JOINT/BURSA W/O US: CPT | Performed by: INTERNAL MEDICINE

## 2025-08-05 PROCEDURE — 3075F SYST BP GE 130 - 139MM HG: CPT | Performed by: INTERNAL MEDICINE

## 2025-08-05 RX ORDER — TRIAMCINOLONE ACETONIDE 40 MG/ML
40 INJECTION, SUSPENSION INTRA-ARTICULAR; INTRAMUSCULAR ONCE
Status: COMPLETED | OUTPATIENT
Start: 2025-08-05 | End: 2025-08-05

## 2025-08-05 RX ADMIN — TRIAMCINOLONE ACETONIDE 40 MG: 40 INJECTION, SUSPENSION INTRA-ARTICULAR; INTRAMUSCULAR at 13:11:00

## (undated) NOTE — LETTER
February 19, 2024         Adrian Ackerman DO  [address] [facility] [address]      Patient: Omar Roe   YOB: 1965   Date of Visit: 2/19/2024       Dear Dr. Ackerman,    I saw your patient, Omar Roe, on 2/19/2024. Enclosed is my consultation / progress note from that encounter. Thank you for allowing me to participate in the care of this patient.    Sincerely,                           Donna Ruiz MD  [address], [address] [place] [id number]    Document electronically generated by:  Donna Ruiz MD on 2/19/2024    CC: No Recipients    Enclosure